# Patient Record
Sex: FEMALE | Race: BLACK OR AFRICAN AMERICAN | NOT HISPANIC OR LATINO | Employment: FULL TIME | URBAN - METROPOLITAN AREA
[De-identification: names, ages, dates, MRNs, and addresses within clinical notes are randomized per-mention and may not be internally consistent; named-entity substitution may affect disease eponyms.]

---

## 2017-02-01 ENCOUNTER — ANESTHESIA (OUTPATIENT)
Dept: SURGERY | Facility: HOSPITAL | Age: 57
DRG: 340 | End: 2017-02-01
Payer: COMMERCIAL

## 2017-02-01 ENCOUNTER — HOSPITAL ENCOUNTER (INPATIENT)
Facility: HOSPITAL | Age: 57
LOS: 1 days | Discharge: HOME | DRG: 340 | End: 2017-02-02
Attending: EMERGENCY MEDICINE | Admitting: SURGERY
Payer: COMMERCIAL

## 2017-02-01 ENCOUNTER — ANESTHESIA EVENT (OUTPATIENT)
Dept: SURGERY | Facility: HOSPITAL | Age: 57
DRG: 340 | End: 2017-02-01
Payer: COMMERCIAL

## 2017-02-01 DIAGNOSIS — Z13.9 SCREENING: ICD-10-CM

## 2017-02-01 DIAGNOSIS — K35.80 ACUTE APPENDICITIS: ICD-10-CM

## 2017-02-01 DIAGNOSIS — R10.31 RIGHT LOWER QUADRANT PAIN: Primary | ICD-10-CM

## 2017-02-01 DIAGNOSIS — Z86.018 H/O PHEOCHROMOCYTOMA: ICD-10-CM

## 2017-02-01 DIAGNOSIS — K35.30 ACUTE APPENDICITIS WITH LOCALIZED PERITONITIS: ICD-10-CM

## 2017-02-01 LAB
ALBUMIN SERPL BCP-MCNC: 3.8 G/DL
ALP SERPL-CCNC: 101 U/L
ALT SERPL W/O P-5'-P-CCNC: 16 U/L
ANION GAP SERPL CALC-SCNC: 8 MMOL/L
AST SERPL-CCNC: 21 U/L
BASOPHILS # BLD AUTO: 0.02 K/UL
BASOPHILS NFR BLD: 0.2 %
BILIRUB SERPL-MCNC: 0.8 MG/DL
BILIRUB UR QL STRIP: NEGATIVE
BUN SERPL-MCNC: 13 MG/DL
CALCIUM SERPL-MCNC: 9.1 MG/DL
CHLORIDE SERPL-SCNC: 107 MMOL/L
CLARITY UR REFRACT.AUTO: CLEAR
CO2 SERPL-SCNC: 22 MMOL/L
COLOR UR AUTO: YELLOW
CREAT SERPL-MCNC: 0.7 MG/DL
DIFFERENTIAL METHOD: ABNORMAL
EOSINOPHIL # BLD AUTO: 0 K/UL
EOSINOPHIL NFR BLD: 0.1 %
ERYTHROCYTE [DISTWIDTH] IN BLOOD BY AUTOMATED COUNT: 12.1 %
EST. GFR  (AFRICAN AMERICAN): >60 ML/MIN/1.73 M^2
EST. GFR  (NON AFRICAN AMERICAN): >60 ML/MIN/1.73 M^2
GLUCOSE SERPL-MCNC: 101 MG/DL
GLUCOSE UR QL STRIP: NEGATIVE
HCT VFR BLD AUTO: 39 %
HGB BLD-MCNC: 13.6 G/DL
HGB UR QL STRIP: NEGATIVE
KETONES UR QL STRIP: NEGATIVE
LEUKOCYTE ESTERASE UR QL STRIP: NEGATIVE
LIPASE SERPL-CCNC: 7 U/L
LYMPHOCYTES # BLD AUTO: 1.4 K/UL
LYMPHOCYTES NFR BLD: 11.8 %
MCH RBC QN AUTO: 31.1 PG
MCHC RBC AUTO-ENTMCNC: 34.9 %
MCV RBC AUTO: 89 FL
MONOCYTES # BLD AUTO: 0.7 K/UL
MONOCYTES NFR BLD: 5.9 %
NEUTROPHILS # BLD AUTO: 9.5 K/UL
NEUTROPHILS NFR BLD: 81.7 %
NITRITE UR QL STRIP: NEGATIVE
PH UR STRIP: 8 [PH] (ref 5–8)
PLATELET # BLD AUTO: 204 K/UL
PMV BLD AUTO: 8.8 FL
POTASSIUM SERPL-SCNC: 4.1 MMOL/L
PROT SERPL-MCNC: 7.1 G/DL
PROT UR QL STRIP: NEGATIVE
RBC # BLD AUTO: 4.38 M/UL
SODIUM SERPL-SCNC: 137 MMOL/L
SP GR UR STRIP: 1.01 (ref 1–1.03)
URN SPEC COLLECT METH UR: NORMAL
UROBILINOGEN UR STRIP-ACNC: NEGATIVE EU/DL
WBC # BLD AUTO: 11.62 K/UL

## 2017-02-01 PROCEDURE — G0378 HOSPITAL OBSERVATION PER HR: HCPCS

## 2017-02-01 PROCEDURE — 99220 PR INITIAL OBSERVATION CARE,LEVL III: CPT | Mod: 57,,, | Performed by: SURGERY

## 2017-02-01 PROCEDURE — 27000221 HC OXYGEN, UP TO 24 HOURS

## 2017-02-01 PROCEDURE — 36000706: Performed by: SURGERY

## 2017-02-01 PROCEDURE — 99285 EMERGENCY DEPT VISIT HI MDM: CPT

## 2017-02-01 PROCEDURE — 36000707: Performed by: SURGERY

## 2017-02-01 PROCEDURE — 83690 ASSAY OF LIPASE: CPT

## 2017-02-01 PROCEDURE — 99283 EMERGENCY DEPT VISIT LOW MDM: CPT | Mod: ,,, | Performed by: EMERGENCY MEDICINE

## 2017-02-01 PROCEDURE — 25000003 PHARM REV CODE 250: Performed by: EMERGENCY MEDICINE

## 2017-02-01 PROCEDURE — 0DTJ0ZZ RESECTION OF APPENDIX, OPEN APPROACH: ICD-10-PCS | Performed by: SURGERY

## 2017-02-01 PROCEDURE — 25000003 PHARM REV CODE 250: Performed by: SURGERY

## 2017-02-01 PROCEDURE — 63600175 PHARM REV CODE 636 W HCPCS: Performed by: SURGERY

## 2017-02-01 PROCEDURE — 85025 COMPLETE CBC W/AUTO DIFF WBC: CPT

## 2017-02-01 PROCEDURE — 63600175 PHARM REV CODE 636 W HCPCS: Performed by: ANESTHESIOLOGY

## 2017-02-01 PROCEDURE — 80053 COMPREHEN METABOLIC PANEL: CPT

## 2017-02-01 PROCEDURE — 44950 APPENDECTOMY: CPT | Mod: ,,, | Performed by: SURGERY

## 2017-02-01 PROCEDURE — 96374 THER/PROPH/DIAG INJ IV PUSH: CPT

## 2017-02-01 PROCEDURE — 63600175 PHARM REV CODE 636 W HCPCS: Performed by: NURSE ANESTHETIST, CERTIFIED REGISTERED

## 2017-02-01 PROCEDURE — D9220A PRA ANESTHESIA: Mod: ANES,,, | Performed by: ANESTHESIOLOGY

## 2017-02-01 PROCEDURE — 71000039 HC RECOVERY, EACH ADD'L HOUR: Performed by: SURGERY

## 2017-02-01 PROCEDURE — 63600175 PHARM REV CODE 636 W HCPCS: Performed by: EMERGENCY MEDICINE

## 2017-02-01 PROCEDURE — 88304 TISSUE EXAM BY PATHOLOGIST: CPT | Performed by: PATHOLOGY

## 2017-02-01 PROCEDURE — 96375 TX/PRO/DX INJ NEW DRUG ADDON: CPT

## 2017-02-01 PROCEDURE — D9220A PRA ANESTHESIA: Mod: CRNA,,, | Performed by: NURSE ANESTHETIST, CERTIFIED REGISTERED

## 2017-02-01 PROCEDURE — 37000008 HC ANESTHESIA 1ST 15 MINUTES: Performed by: SURGERY

## 2017-02-01 PROCEDURE — 27201423 OPTIME MED/SURG SUP & DEVICES STERILE SUPPLY: Performed by: SURGERY

## 2017-02-01 PROCEDURE — 96361 HYDRATE IV INFUSION ADD-ON: CPT

## 2017-02-01 PROCEDURE — 25500020 PHARM REV CODE 255: Performed by: EMERGENCY MEDICINE

## 2017-02-01 PROCEDURE — 81003 URINALYSIS AUTO W/O SCOPE: CPT

## 2017-02-01 PROCEDURE — 88304 TISSUE EXAM BY PATHOLOGIST: CPT | Mod: 26,,, | Performed by: PATHOLOGY

## 2017-02-01 PROCEDURE — 94761 N-INVAS EAR/PLS OXIMETRY MLT: CPT

## 2017-02-01 PROCEDURE — 96376 TX/PRO/DX INJ SAME DRUG ADON: CPT

## 2017-02-01 PROCEDURE — 25000003 PHARM REV CODE 250: Performed by: NURSE ANESTHETIST, CERTIFIED REGISTERED

## 2017-02-01 PROCEDURE — 37000009 HC ANESTHESIA EA ADD 15 MINS: Performed by: SURGERY

## 2017-02-01 PROCEDURE — 71000033 HC RECOVERY, INTIAL HOUR: Performed by: SURGERY

## 2017-02-01 RX ORDER — OXYCODONE AND ACETAMINOPHEN 10; 325 MG/1; MG/1
1 TABLET ORAL EVERY 4 HOURS PRN
Status: DISCONTINUED | OUTPATIENT
Start: 2017-02-01 | End: 2017-02-02 | Stop reason: HOSPADM

## 2017-02-01 RX ORDER — ONDANSETRON 2 MG/ML
4 INJECTION INTRAMUSCULAR; INTRAVENOUS ONCE AS NEEDED
Status: DISCONTINUED | OUTPATIENT
Start: 2017-02-01 | End: 2017-02-01

## 2017-02-01 RX ORDER — KETOROLAC TROMETHAMINE 30 MG/ML
INJECTION, SOLUTION INTRAMUSCULAR; INTRAVENOUS
Status: DISCONTINUED | OUTPATIENT
Start: 2017-02-01 | End: 2017-02-01

## 2017-02-01 RX ORDER — FENTANYL CITRATE 50 UG/ML
INJECTION, SOLUTION INTRAMUSCULAR; INTRAVENOUS
Status: DISCONTINUED | OUTPATIENT
Start: 2017-02-01 | End: 2017-02-01

## 2017-02-01 RX ORDER — HYDROMORPHONE HYDROCHLORIDE 1 MG/ML
0.2 INJECTION, SOLUTION INTRAMUSCULAR; INTRAVENOUS; SUBCUTANEOUS EVERY 5 MIN PRN
Status: DISCONTINUED | OUTPATIENT
Start: 2017-02-01 | End: 2017-02-01

## 2017-02-01 RX ORDER — GLYCOPYRROLATE 0.2 MG/ML
INJECTION INTRAMUSCULAR; INTRAVENOUS
Status: DISCONTINUED | OUTPATIENT
Start: 2017-02-01 | End: 2017-02-01

## 2017-02-01 RX ORDER — BUPIVACAINE HYDROCHLORIDE 5 MG/ML
INJECTION, SOLUTION PERINEURAL
Status: DISCONTINUED | OUTPATIENT
Start: 2017-02-01 | End: 2017-02-01 | Stop reason: HOSPADM

## 2017-02-01 RX ORDER — DEXAMETHASONE SODIUM PHOSPHATE 4 MG/ML
INJECTION, SOLUTION INTRA-ARTICULAR; INTRALESIONAL; INTRAMUSCULAR; INTRAVENOUS; SOFT TISSUE
Status: DISCONTINUED | OUTPATIENT
Start: 2017-02-01 | End: 2017-02-01

## 2017-02-01 RX ORDER — SODIUM CHLORIDE 0.9 % (FLUSH) 0.9 %
3 SYRINGE (ML) INJECTION EVERY 8 HOURS
Status: DISCONTINUED | OUTPATIENT
Start: 2017-02-01 | End: 2017-02-02 | Stop reason: HOSPADM

## 2017-02-01 RX ORDER — ACETAMINOPHEN 10 MG/ML
INJECTION, SOLUTION INTRAVENOUS
Status: DISCONTINUED | OUTPATIENT
Start: 2017-02-01 | End: 2017-02-01

## 2017-02-01 RX ORDER — LIDOCAINE HCL/PF 100 MG/5ML
SYRINGE (ML) INTRAVENOUS
Status: DISCONTINUED | OUTPATIENT
Start: 2017-02-01 | End: 2017-02-01

## 2017-02-01 RX ORDER — ROCURONIUM BROMIDE 10 MG/ML
INJECTION, SOLUTION INTRAVENOUS
Status: DISCONTINUED | OUTPATIENT
Start: 2017-02-01 | End: 2017-02-01

## 2017-02-01 RX ORDER — HYDROMORPHONE HYDROCHLORIDE 1 MG/ML
0.5 INJECTION, SOLUTION INTRAMUSCULAR; INTRAVENOUS; SUBCUTANEOUS EVERY 4 HOURS PRN
Status: DISCONTINUED | OUTPATIENT
Start: 2017-02-01 | End: 2017-02-02 | Stop reason: HOSPADM

## 2017-02-01 RX ORDER — SODIUM CHLORIDE 0.9 % (FLUSH) 0.9 %
3 SYRINGE (ML) INJECTION
Status: DISCONTINUED | OUTPATIENT
Start: 2017-02-01 | End: 2017-02-02 | Stop reason: HOSPADM

## 2017-02-01 RX ORDER — ONDANSETRON 2 MG/ML
4 INJECTION INTRAMUSCULAR; INTRAVENOUS
Status: COMPLETED | OUTPATIENT
Start: 2017-02-01 | End: 2017-02-01

## 2017-02-01 RX ORDER — DIPHENHYDRAMINE HYDROCHLORIDE 50 MG/ML
25 INJECTION INTRAMUSCULAR; INTRAVENOUS EVERY 4 HOURS PRN
Status: DISCONTINUED | OUTPATIENT
Start: 2017-02-01 | End: 2017-02-02 | Stop reason: HOSPADM

## 2017-02-01 RX ORDER — PROPOFOL 10 MG/ML
VIAL (ML) INTRAVENOUS
Status: DISCONTINUED | OUTPATIENT
Start: 2017-02-01 | End: 2017-02-01

## 2017-02-01 RX ORDER — HYDROMORPHONE HYDROCHLORIDE 1 MG/ML
0.2 INJECTION, SOLUTION INTRAMUSCULAR; INTRAVENOUS; SUBCUTANEOUS EVERY 5 MIN PRN
Status: DISCONTINUED | OUTPATIENT
Start: 2017-02-01 | End: 2017-02-01 | Stop reason: HOSPADM

## 2017-02-01 RX ORDER — MORPHINE SULFATE 2 MG/ML
6 INJECTION, SOLUTION INTRAMUSCULAR; INTRAVENOUS
Status: COMPLETED | OUTPATIENT
Start: 2017-02-01 | End: 2017-02-01

## 2017-02-01 RX ORDER — ONDANSETRON 2 MG/ML
4 INJECTION INTRAMUSCULAR; INTRAVENOUS ONCE AS NEEDED
Status: DISCONTINUED | OUTPATIENT
Start: 2017-02-01 | End: 2017-02-01 | Stop reason: HOSPADM

## 2017-02-01 RX ORDER — NEOSTIGMINE METHYLSULFATE 1 MG/ML
INJECTION, SOLUTION INTRAVENOUS
Status: DISCONTINUED | OUTPATIENT
Start: 2017-02-01 | End: 2017-02-01

## 2017-02-01 RX ORDER — ACETAMINOPHEN 325 MG/1
650 TABLET ORAL EVERY 8 HOURS PRN
Status: DISCONTINUED | OUTPATIENT
Start: 2017-02-01 | End: 2017-02-02 | Stop reason: HOSPADM

## 2017-02-01 RX ORDER — MORPHINE SULFATE 2 MG/ML
4 INJECTION, SOLUTION INTRAMUSCULAR; INTRAVENOUS
Status: COMPLETED | OUTPATIENT
Start: 2017-02-01 | End: 2017-02-01

## 2017-02-01 RX ORDER — METRONIDAZOLE 500 MG/100ML
500 INJECTION, SOLUTION INTRAVENOUS
Status: COMPLETED | OUTPATIENT
Start: 2017-02-01 | End: 2017-02-02

## 2017-02-01 RX ORDER — MIDAZOLAM HYDROCHLORIDE 1 MG/ML
INJECTION, SOLUTION INTRAMUSCULAR; INTRAVENOUS
Status: DISCONTINUED | OUTPATIENT
Start: 2017-02-01 | End: 2017-02-01

## 2017-02-01 RX ORDER — HYDROMORPHONE HYDROCHLORIDE 1 MG/ML
1 INJECTION, SOLUTION INTRAMUSCULAR; INTRAVENOUS; SUBCUTANEOUS EVERY 4 HOURS PRN
Status: DISCONTINUED | OUTPATIENT
Start: 2017-02-01 | End: 2017-02-01

## 2017-02-01 RX ORDER — OXYCODONE AND ACETAMINOPHEN 5; 325 MG/1; MG/1
1 TABLET ORAL EVERY 4 HOURS PRN
Status: DISCONTINUED | OUTPATIENT
Start: 2017-02-01 | End: 2017-02-02 | Stop reason: HOSPADM

## 2017-02-01 RX ORDER — CEFAZOLIN SODIUM 2 G/50ML
2 SOLUTION INTRAVENOUS
Status: DISCONTINUED | OUTPATIENT
Start: 2017-02-01 | End: 2017-02-02 | Stop reason: HOSPADM

## 2017-02-01 RX ORDER — ONDANSETRON 2 MG/ML
INJECTION INTRAMUSCULAR; INTRAVENOUS
Status: DISCONTINUED | OUTPATIENT
Start: 2017-02-01 | End: 2017-02-01

## 2017-02-01 RX ORDER — ONDANSETRON 2 MG/ML
4 INJECTION INTRAMUSCULAR; INTRAVENOUS EVERY 6 HOURS PRN
Status: DISCONTINUED | OUTPATIENT
Start: 2017-02-01 | End: 2017-02-02 | Stop reason: HOSPADM

## 2017-02-01 RX ORDER — MORPHINE SULFATE 2 MG/ML
2 INJECTION, SOLUTION INTRAMUSCULAR; INTRAVENOUS EVERY 4 HOURS PRN
Status: DISCONTINUED | OUTPATIENT
Start: 2017-02-01 | End: 2017-02-01

## 2017-02-01 RX ORDER — SODIUM CHLORIDE 9 MG/ML
INJECTION, SOLUTION INTRAVENOUS CONTINUOUS
Status: DISCONTINUED | OUTPATIENT
Start: 2017-02-01 | End: 2017-02-02 | Stop reason: HOSPADM

## 2017-02-01 RX ORDER — CIPROFLOXACIN 2 MG/ML
400 INJECTION, SOLUTION INTRAVENOUS
Status: COMPLETED | OUTPATIENT
Start: 2017-02-01 | End: 2017-02-02

## 2017-02-01 RX ORDER — SUCCINYLCHOLINE CHLORIDE 20 MG/ML
INJECTION INTRAMUSCULAR; INTRAVENOUS
Status: DISCONTINUED | OUTPATIENT
Start: 2017-02-01 | End: 2017-02-01

## 2017-02-01 RX ADMIN — SODIUM CHLORIDE: 0.9 INJECTION, SOLUTION INTRAVENOUS at 06:02

## 2017-02-01 RX ADMIN — ACETAMINOPHEN 1000 MG: 10 INJECTION, SOLUTION INTRAVENOUS at 07:02

## 2017-02-01 RX ADMIN — FENTANYL CITRATE 50 MCG: 50 INJECTION, SOLUTION INTRAMUSCULAR; INTRAVENOUS at 07:02

## 2017-02-01 RX ADMIN — CIPROFLOXACIN 400 MG: 2 INJECTION, SOLUTION INTRAVENOUS at 10:02

## 2017-02-01 RX ADMIN — METRONIDAZOLE 500 MG: 500 INJECTION, SOLUTION INTRAVENOUS at 09:02

## 2017-02-01 RX ADMIN — FENTANYL CITRATE 100 MCG: 50 INJECTION, SOLUTION INTRAMUSCULAR; INTRAVENOUS at 08:02

## 2017-02-01 RX ADMIN — PROPOFOL 120 MG: 10 INJECTION, EMULSION INTRAVENOUS at 07:02

## 2017-02-01 RX ADMIN — MIDAZOLAM HYDROCHLORIDE 2 MG: 1 INJECTION, SOLUTION INTRAMUSCULAR; INTRAVENOUS at 07:02

## 2017-02-01 RX ADMIN — ROCURONIUM BROMIDE 30 MG: 10 INJECTION, SOLUTION INTRAVENOUS at 07:02

## 2017-02-01 RX ADMIN — SODIUM CHLORIDE: 0.9 INJECTION, SOLUTION INTRAVENOUS at 07:02

## 2017-02-01 RX ADMIN — SODIUM CHLORIDE, SODIUM LACTATE, POTASSIUM CHLORIDE, AND CALCIUM CHLORIDE 1000 ML: .6; .31; .03; .02 INJECTION, SOLUTION INTRAVENOUS at 11:02

## 2017-02-01 RX ADMIN — SODIUM CHLORIDE, SODIUM LACTATE, POTASSIUM CHLORIDE, AND CALCIUM CHLORIDE 1000 ML: .6; .31; .03; .02 INJECTION, SOLUTION INTRAVENOUS at 08:02

## 2017-02-01 RX ADMIN — IOHEXOL 100 ML: 350 INJECTION, SOLUTION INTRAVENOUS at 02:02

## 2017-02-01 RX ADMIN — ONDANSETRON 4 MG: 2 INJECTION INTRAMUSCULAR; INTRAVENOUS at 08:02

## 2017-02-01 RX ADMIN — HYDROMORPHONE HYDROCHLORIDE 0.2 MG: 1 INJECTION, SOLUTION INTRAMUSCULAR; INTRAVENOUS; SUBCUTANEOUS at 09:02

## 2017-02-01 RX ADMIN — ROCURONIUM BROMIDE 10 MG: 10 INJECTION, SOLUTION INTRAVENOUS at 07:02

## 2017-02-01 RX ADMIN — CEFAZOLIN SODIUM 2 G: 2 SOLUTION INTRAVENOUS at 07:02

## 2017-02-01 RX ADMIN — GLYCOPYRROLATE 0.6 MG: 0.2 INJECTION, SOLUTION INTRAMUSCULAR; INTRAVENOUS at 08:02

## 2017-02-01 RX ADMIN — SUCCINYLCHOLINE CHLORIDE 100 MG: 20 INJECTION, SOLUTION INTRAMUSCULAR; INTRAVENOUS at 07:02

## 2017-02-01 RX ADMIN — ONDANSETRON 4 MG: 2 INJECTION INTRAMUSCULAR; INTRAVENOUS at 10:02

## 2017-02-01 RX ADMIN — KETOROLAC TROMETHAMINE 30 MG: 30 INJECTION, SOLUTION INTRAMUSCULAR; INTRAVENOUS at 08:02

## 2017-02-01 RX ADMIN — NEOSTIGMINE METHYLSULFATE 5 MG: 1 INJECTION INTRAVENOUS at 08:02

## 2017-02-01 RX ADMIN — MORPHINE SULFATE 4 MG: 2 INJECTION, SOLUTION INTRAMUSCULAR; INTRAVENOUS at 02:02

## 2017-02-01 RX ADMIN — DEXAMETHASONE SODIUM PHOSPHATE 8 MG: 4 INJECTION, SOLUTION INTRAMUSCULAR; INTRAVENOUS at 07:02

## 2017-02-01 RX ADMIN — SODIUM CHLORIDE, PRESERVATIVE FREE 3 ML: 5 INJECTION INTRAVENOUS at 10:02

## 2017-02-01 RX ADMIN — OXYCODONE HYDROCHLORIDE AND ACETAMINOPHEN 1 TABLET: 10; 325 TABLET ORAL at 09:02

## 2017-02-01 RX ADMIN — LIDOCAINE HYDROCHLORIDE 100 MG: 20 INJECTION, SOLUTION INTRAVENOUS at 07:02

## 2017-02-01 RX ADMIN — SODIUM CHLORIDE: 0.9 INJECTION, SOLUTION INTRAVENOUS at 09:02

## 2017-02-01 RX ADMIN — MORPHINE SULFATE 6 MG: 2 INJECTION, SOLUTION INTRAMUSCULAR; INTRAVENOUS at 08:02

## 2017-02-01 RX ADMIN — HYDROMORPHONE HYDROCHLORIDE 0.2 MG: 1 INJECTION, SOLUTION INTRAMUSCULAR; INTRAVENOUS; SUBCUTANEOUS at 08:02

## 2017-02-01 NOTE — ED TRIAGE NOTES
Pt c/o epigastric pain, nausea, vomiting, diarrhea that began at 0230 this morning. Denies blood in emesis or stool. Pt states she has had a bowel obstruction in the past and feels the same. Pt states she took dulcolax and Peptobismol without relief. Pain is located in epigastric and and umbilical region. Pt c/o nausea and 10/10 pain

## 2017-02-01 NOTE — ED PROVIDER NOTES
Encounter Date: 2/1/2017    SCRIBE #1 NOTE: I, Lisandro Qiu, am scribing for, and in the presence of,  Dr. Box. I have scribed the entire note.       History     Chief Complaint   Patient presents with    Abdominal Pain     had boweol obstruction in past, started at 2am, took dulcolox, nvd     Review of patient's allergies indicates:  No Known Allergies  HPI Comments: Time patient was seen by the provider: 8:05 AM      The patient is a 56 y.o. female with hx of: bowel obstruction and abdominal adhesion surgery that presents to the ED with a complaint of severe epigastric abdominal pain, which began 6 hours ago. She notes an associated nausea/vomiting. Pt states symptoms are similar but less severe from previous bowel obstruction. She endorses taking Dulcolax and Pepto-bismol without symptoms relief. No fever.   The history is provided by the patient.     Past Medical History   Diagnosis Date    Genital warts     H/O pheochromocytoma      Past Medical History Pertinent Negatives   Diagnosis Date Noted    Abnormal Pap smear 10/10/2013    Adrenal tumor 10/10/2013    Coronary artery disease 4/4/2014    Depression 4/4/2014    Diabetes mellitus 4/4/2014    Tumor cells, malignant 10/10/2013     Past Surgical History   Procedure Laterality Date    Tumor removal  2004    Abdominal adhesion surgery      Cervical conization   w/ laser      Colonoscopy N/A 8/1/2016     Procedure: COLONOSCOPY;  Surgeon: MIRNA Hawk MD;  Location: [facility] (4TH FLR);  Service: Endoscopy;  Laterality: N/A;    Appendectomy N/A 2/1/2017     Procedure: APPENDECTOMY;  Surgeon: Mando Mccarthy MD;  Location: [facility] OR [address] [place] [address];  Service: General;  Laterality: N/A;     Family History   Problem Relation Age of Onset    Diabetes Mother     Stroke Mother     Heart disease Father     Hypertension Father     Cancer Maternal Aunt     Breast cancer Cousin     Colon cancer Neg Hx     Ovarian cancer Neg Hx      Social History    Substance Use Topics    Smoking status: Never Smoker    Smokeless tobacco: Never Used    Alcohol use No     Review of Systems   Constitutional: Negative for fever.   HENT: Negative for sore throat.    Respiratory: Negative for shortness of breath.    Cardiovascular: Negative for chest pain.   Gastrointestinal: Positive for abdominal pain, nausea and vomiting.   Genitourinary: Negative for dysuria.   Musculoskeletal: Negative for back pain.   Skin: Negative for rash.   Neurological: Negative for weakness.   Hematological: Does not bruise/bleed easily.       Physical Exam   Initial Vitals   BP Pulse Resp Temp SpO2   02/01/17 0716 02/01/17 0716 02/01/17 0716 02/01/17 0716 02/01/17 0716   168/89 81 18 98.3 °F (36.8 °C) 100 %     Physical Exam    Nursing note and vitals reviewed.  Constitutional: She appears well-developed and well-nourished.   HENT:   Head: Normocephalic and atraumatic.   Cardiovascular: Normal rate, regular rhythm and normal heart sounds.   No murmur heard.  Pulmonary/Chest: Breath sounds normal. No respiratory distress.   Abdominal: There is no rebound and no guarding.   Tenderness in the epigastrium    Musculoskeletal: Normal range of motion.   Neurological: She is alert and oriented to person, place, and time.   Skin: Skin is warm and dry.         ED Course   Procedures  Labs Reviewed   CBC W/ AUTO DIFFERENTIAL - Abnormal; Notable for the following:        Result Value    MCH 31.1 (*)     MPV 8.8 (*)     Gran # 9.5 (*)     Gran% 81.7 (*)     Lymph% 11.8 (*)     All other components within normal limits   COMPREHENSIVE METABOLIC PANEL - Abnormal; Notable for the following:     CO2 22 (*)     All other components within normal limits   LIPASE   URINALYSIS             Medical Decision Making:   History:   Old Medical Records: I decided to obtain old medical records.  Initial Assessment:   Pt with epigastric abdominal pain. Will rule out bowel obstruction, do CT, labs and anticipate admission.     Clinical Tests:   Lab Tests: Ordered and Reviewed  Radiological Study: Ordered and Reviewed            Scribe Attestation:   Scribe #1: I performed the above scribed service and the documentation accurately describes the services I performed. I attest to the accuracy of the note.    Attending Attestation:           Physician Attestation for Scribe:  Physician Attestation Statement for Scribe #1: I, Dr. Box, reviewed documentation, as scribed by Lisandro Qiu in my presence, and it is both accurate and complete.         CT shows appendicitis.  Surgery to admit for OR.        ED Course     Clinical Impression:   The primary encounter diagnosis was Right lower quadrant pain. Diagnoses of Acute appendicitis, Acute appendicitis with localized peritonitis, H/O pheochromocytoma, and Screening were also pertinent to this visit.          Gopi Box MD  02/04/17 0107

## 2017-02-01 NOTE — IP AVS SNAPSHOT
[facility]  [address]  [facility] [address]  Phone: [phone]           Patient Discharge Instructions     Our goal is to set you up for success. This packet includes information on your condition, medications, and your home care. It will help you to care for yourself so you don't get sicker and need to go back to the hospital.     Please ask your nurse if you have any questions.        There are many details to remember when preparing to leave the hospital. Here is what you will need to do:    1. Take your medicine. If you are prescribed medications, review your Medication List in the following pages. You may have new medications to  at the pharmacy and others that you'll need to stop taking. Review the instructions for how and when to take your medications. Talk with your doctor or nurses if you are unsure of what to do.     2. Go to your follow-up appointments. Specific follow-up information is listed in the following pages. Your may be contacted by a transition nurse or clinical provider about future appointments. Be sure we have all of the phone numbers to reach you, if needed. Please contact your provider's office if you are unable to make an appointment.     3. Watch for warning signs. Your doctor or nurse will give you detailed warning signs to watch for and when to call for assistance. These instructions may also include educational information about your condition. If you experience any of warning signs to your health, call your doctor.               [facility]  Unless otherwise directed by your provider, please contact [facility], our nurse care line that is available for 24/7 assistance.     [phone] (toll-free)    Registered nurses in the [facility] provide clinical advisement, health education, appointment booking, and other advisory services.                    ** Verify the list of medication(s) below is accurate and up  to date. Carry this with you in case of emergency. If your medications have changed, please notify your healthcare provider.             Medication List      START taking these medications        Additional Info                      docusate sodium 50 MG capsule   Commonly known as:  COLACE   Refills:  0   Dose:  50 mg    Instructions:  Take 1 capsule (50 mg total) by mouth 2 (two) times daily.     Begin Date    AM    Noon    PM    Bedtime       oxycodone-acetaminophen 5-325 mg per tablet   Commonly known as:  PERCOCET   Quantity:  60 tablet   Refills:  0   Dose:  1-2 tablet    Instructions:  Take 1-2 tablets by mouth every 4 (four) hours as needed for Pain.     Begin Date    AM    Noon    PM    Bedtime         CONTINUE taking these medications        Additional Info                      levonorgestrel 20 mcg/24 hr (5 years) IUD   Commonly known as:  MIRENA   Refills:  0   Dose:  1 each    Instructions:  1 each by Intrauterine route once.     Begin Date    AM    Noon    PM    Bedtime            Where to Get Your Medications      You can get these medications from any pharmacy     Bring a paper prescription for each of these medications     oxycodone-acetaminophen 5-325 mg per tablet         Information about where to get these medications is not yet available     ! Ask your nurse or doctor about these medications     docusate sodium 50 MG capsule                  Please bring to all follow up appointments:    1. A copy of your discharge instructions.  2. All medicines you are currently taking in their original bottles.  3. Identification and insurance card.    Please arrive 15 minutes ahead of scheduled appointment time.    Please call 24 hours in advance if you must reschedule your appointment and/or time.        Your Scheduled Appointments     Feb 17, 2017 10:45 AM CST   Post OP with MD Jaspreet Mason - General Surgery ([address] )    [address]  [facility] [address]  "  [phone]            Mar 06, 2017  9:20 AM CST   Physical with Agustin Hernández MD   [facility]    [address]  [facility] [address] [phone]              Follow-up Information     Follow up with Mando Mccarthy MD In 2 weeks.    Specialty:  General Surgery    Why:  Wound check/Post-op appt scheduled on 2/17/17 at 10:45 AM.     Contact information:    [address]  [facility] [address] [phone]          Discharge Instructions     Future Orders    Call MD for:  difficulty breathing or increased cough     Call MD for:  persistent nausea and vomiting or diarrhea     Call MD for:  redness, tenderness, or signs of infection (pain, swelling, redness, odor or green/yellow discharge around incision site)     Call MD for:  severe uncontrolled pain     Call MD for:  temperature >100.4     Diet general     Questions:    Total calories:      Fat restriction, if any:      Protein restriction, if any:      Na restriction, if any:      Fluid restriction:      Additional restrictions:      Lifting restrictions     Comments:    No heavy lifting, nothing heavier than 10lbs        Primary Diagnosis     Your primary diagnosis was:  Appendicitis      Admission Information     Date & Time Provider Department CSN    2/1/2017  7:55 AM Mando Mccarthy MD [facility] [id number]      Care Providers     Provider Role Specialty Primary office phone    Mando Mccarthy MD Attending Provider General Surgery [phone]    Mando Mccarthy MD Surgeon  General Surgery [phone]      Your Vitals Were     BP Pulse Temp Resp Height Weight    108/68 87 97.8 °F (36.6 °C) (Oral) 18 5' 8" (1.727 m) 82.6 kg (182 lb)    SpO2 BMI             99% 27.67 kg/m2         Recent Lab Values        10/10/2013                           1:57 PM           A1C 5.4                       Pending Labs     Order Current Status    Specimen to Pathology - Surgery In process    "   Allergies as of 2/2/2017     No Known Allergies      Advance Directives     An advance directive is a document which, in the event you are no longer able to make decisions for yourself, tells your healthcare team what kind of treatment you do or do not want to receive, or who you would like to make those decisions for you.  If you do not currently have an advance directive, [facility] encourages you to create one.  For more information call:  [phone]), [phone] ([phone]),  or log on to [email or web address].        Language Assistance Services     ATTENTION: Language assistance services are available, free of charge. Please call [phone].      ATENCIÓN: Si norala clarisa, tiene a escamilla disposición servicios gratuitos de asistencia lingüística. Llame al [phone].     CHÚ Ý: N?u b?n nói Ti?ng Vi?t, có các d?ch v? h? tr? ngôn ng? mi?n phí dành cho b?n. G?i s? [phone].        [facility] Sign-Up     Activating your [facility] account is as easy as 1-2-3!     1) Visit [email or web address], select Sign Up Now, enter this activation code and your date of birth, then select Next.  [email or web address]  Expires: 3/19/2017  1:37 PM      2) Create a username and password to use when you visit [facility] in the future and select a security question in case you lose your password and select Next.    3) Enter your e-mail address and click Sign Up!    Additional Information  If you have questions, please e-mail [email or web address] or call [phone] to talk to our [facility] staff. Remember, [facility] is NOT to be used for urgent needs. For medical emergencies, dial 911.          [facility] complies with applicable Federal civil rights laws and does not discriminate on the basis of race, color, national origin, age, disability, or sex.

## 2017-02-01 NOTE — CONSULTS
Consult Note  General Surgery    Consult Requested By: ED  Reason for Consult: abd pain    SUBJECTIVE:     History of Present Illness:  Patient is a 56 y.o. female s/p lap converted to open right adrenalectomy for pheo in 2005, s/p ex lap and enterolysis for sbo in 2006, who presents with 1 day history of lower abd pain.  Pain started at 2 am, now localizing to RLQ.  Assoc with nausea / emesis, and diarrhea.  Unable to eat since yesterday.  No fevers / chills.  She has never felt this way before.      Scheduled Meds:   sodium chloride 0.9%  3 mL Intravenous Q8H     Continuous Infusions:   sodium chloride 0.9%       PRN Meds:acetaminophen, ceFAZolin 2 g/50mL Dextrose IVPB, diphenhydrAMINE, HYDROmorphone, morphine, ondansetron, promethazine (PHENERGAN) IVPB    Review of patient's allergies indicates:  No Known Allergies    Past Medical History   Diagnosis Date    Genital warts     H/O pheochromocytoma      Past Surgical History   Procedure Laterality Date    Tumor removal  2004    Abdominal adhesion surgery      Cervical conization   w/ laser      Colonoscopy N/A 8/1/2016     Procedure: COLONOSCOPY;  Surgeon: MIRNA Hawk MD;  Location: [address]);  Service: Endoscopy;  Laterality: N/A;     Family History   Problem Relation Age of Onset    Diabetes Mother     Stroke Mother     Heart disease Father     Hypertension Father     Cancer Maternal Aunt     Breast cancer Cousin     Colon cancer Neg Hx     Ovarian cancer Neg Hx      Social History   Substance Use Topics    Smoking status: Never Smoker    Smokeless tobacco: Never Used    Alcohol use No        Review of Systems:  Constitutional: Negative for fever.   HENT: Negative for sore throat.   Respiratory: Negative for shortness of breath.   Cardiovascular: Negative for chest pain.   Gastrointestinal: Positive for abdominal pain, nausea and vomiting.   Genitourinary: Negative for dysuria.   Musculoskeletal: Negative for back pain.   Skin:  Negative for rash.   Neurological: Negative for weakness.   Hematological: Does not bruise/bleed easily.     OBJECTIVE:     Vital Signs (Most Recent)  Temp: 98.1 °F (36.7 °C) (02/01/17 1259)  Pulse: 78 (02/01/17 1259)  Resp: 18 (02/01/17 1259)  BP: (!) 161/79 (02/01/17 1259)  SpO2: 100 % (02/01/17 0716)    Vital Signs Range (Last 24H):  Temp:  [98.1 °F (36.7 °C)-98.3 °F (36.8 °C)]   Pulse:  [78-81]   Resp:  [18]   BP: (161-168)/(79-89)   SpO2:  [100 %]     Physical Exam:  General: well developed, well nourished, no distress  Head: normocephalic, atraumatic  Eyes:  conjunctivae/corneas clear.  Lungs:  clear to auscultation bilaterally and normal respiratory effort  Heart: regular rate and rhythm  Abdomen: soft, non-distended, exquisite tenderness at McBurney's point; well healed midline incision  Extremities: no cyanosis or edema, or clubbing  Skin: Skin color, texture, turgor normal. No rashes or lesions    Laboratory:  CBC:   Recent Labs  Lab 02/01/17  1257   WBC 11.62   RBC 4.38   HGB 13.6   HCT 39.0        CMP:   Recent Labs  Lab 02/01/17  1257      CALCIUM 9.1   ALBUMIN 3.8   PROT 7.1      K 4.1   CO2 22*      BUN 13   CREATININE 0.7   ALKPHOS 101   ALT 16   AST 21   BILITOT 0.8     Coagulation: No results for input(s): INR, APTT in the last 168 hours.    Invalid input(s): PT    Diagnostic Results:  CT reviewed - dilated appendix at 1.5 cm without significant inflammatory changes    ASSESSMENT/PLAN:     57 y/o female with acute appendicitis    Place in obs / Surgery / Shari  NPO, IVF  IV abx  To OR for lap appy with low threshold to convert to open given prior surgical history.  Consent obtained.    This case was discussed with Dr. Mccarthy who is in agreement with this plan.  Patient and family updated on care plan and voice understanding.    Yosef Carr MD  General Surgery, PGY-V  [phone]

## 2017-02-01 NOTE — ANESTHESIA PREPROCEDURE EVALUATION
02/01/2017    Pre-operative evaluation for APPENDECTOMY-LAPAROSCOPIC (N/A)    Chief Complaint:    PMH:    Past Surgical History   Procedure Laterality Date    Tumor removal  2004    Abdominal adhesion surgery      Cervical conization   w/ laser      Colonoscopy N/A 8/1/2016     Procedure: COLONOSCOPY;  Surgeon: MIRNA Hawk MD;  Location: [facility] ([address] [place] [address]);  Service: Endoscopy;  Laterality: N/A;         Vital Signs Range (Last 24H):  Temp:  [36.7 °C (98.1 °F)-36.8 °C (98.3 °F)]   Pulse:  [78-81]   Resp:  [18]   BP: (161-168)/(79-89)   SpO2:  [100 %]       CBC:     Recent Labs  Lab 02/01/17  1257   WBC 11.62   RBC 4.38   HGB 13.6   HCT 39.0      MCV 89   MCH 31.1*   MCHC 34.9       CMP:   Recent Labs  Lab 02/01/17  1257      K 4.1      CO2 22*   BUN 13   CREATININE 0.7      CALCIUM 9.1   ALBUMIN 3.8   PROT 7.1   ALKPHOS 101   ALT 16   AST 21   BILITOT 0.8       INR:  No results for input(s): INR, PROTIME, APTT in the last 720 hours.    Invalid input(s): PT      Diagnostic Studies:      EKG:  None on file    2D Echo:  None on file    [facility] Anesthesia Evaluation    I have reviewed the Patient Summary Reports.    I have reviewed the Nursing Notes.   I have reviewed the Medications.     Review of Systems  Anesthesia Hx:  No problems with previous Anesthesia  History of prior surgery of interest to airway management or planning: Previous anesthesia: General Denies Family Hx of Anesthesia complications.   Denies Personal Hx of Anesthesia complications.   Social:  Non-Smoker, No Alcohol Use    Hematology/Oncology:  Hematology Normal   Oncology Normal     EENT/Dental:EENT/Dental Normal   Cardiovascular:   Exercise tolerance: good Denies Hypertension.  Denies MI.  Denies CAD.        hyperlipidemia    Pulmonary:  Pulmonary Normal  Denies COPD.  Denies Asthma.  Denies Shortness of  breath.  Denies Recent URI.    Renal/:  Renal/ Normal     Hepatic/GI:  Hepatic/GI Normal  Denies GERD.    Musculoskeletal:  Musculoskeletal Normal    Neurological:  Neurology Normal Denies TIA.  Denies CVA.    Endocrine:  Endocrine Normal Denies Diabetes.    Dermatological:  Skin Normal    Psych:  Psychiatric Normal           Physical Exam  General:  Well nourished    Airway/Jaw/Neck:  Airway Findings: Mouth Opening: Normal Tongue: Normal  General Airway Assessment: Adult  Mallampati: II  Improves to II with phonation.  TM Distance: Normal, at least 6 cm  Jaw/Neck Findings:  Micrognathia: Negative Mandibular Fracture: Negative    Neck ROM: Normal ROM  Neck Findings: Normal    Eyes/Ears/Nose:  EYES/EARS/NOSE FINDINGS: Normal   Dental:  Dental Findings: In tact    Chest/Lungs:  Chest/Lungs Findings: Clear to auscultation, Normal Respiratory Rate     Heart/Vascular:  Heart Findings: Rate: Normal  Rhythm: Regular Rhythm  Sounds: Normal  Heart murmur: negative Vascular Findings: Normal       Mental Status:  Mental Status Findings: Normal        Anesthesia Plan  Type of Anesthesia, risks & benefits discussed:  Anesthesia Type:  general  Patient's Preference:   Intra-op Monitoring Plan: standard ASA monitors  Intra-op Monitoring Plan Comments:   Post Op Pain Control Plan:   Post Op Pain Control Plan Comments:   Induction:   IV  Beta Blocker:  Patient is not currently on a Beta-Blocker (No further documentation required).       Informed Consent: Patient understands risks and agrees with Anesthesia plan.  Questions answered. Anesthesia consent signed with patient.  ASA Score: 2  emergent   Day of Surgery Review of History & Physical:    H&P update referred to the surgeon.         Ready For Surgery From Anesthesia Perspective.

## 2017-02-01 NOTE — ED NOTES
After several attempts to obtain IV access by several different nurses, unsuccessful. Dr. Box aware. Venipuncture called and aware pt needs blood

## 2017-02-01 NOTE — PROVIDER PROGRESS NOTES - EMERGENCY DEPT.
Encounter Date: 2/1/2017    ED Physician Progress Notes       SCRIBE NOTE: I, Janett Hanley, am scribing for, and in the presence of,  Dr. Loza.  Physician Statement: I, Dr. Loza, personally performed the services described in this documentation as scribed by Janett Hanley in my presence, and it is both accurate and complete.     Physician Note:   2:10 PM  Pt signed out to me in stable condition by Dr. Box, pending CT A/P and final disposition.    Pt is a 57 yo F who came to the ED for N/V and abdominal pain. Labs are unremarkable.     4:45 PM  CT A/P shows a dilated tubular structure in the RLQ which could be the appendix. This may represent developing appendicitis. Otherwise no evidence of obstruction. General surgery contacted and case discussed. They will evaluate the pt.    5:00 PM  Pt will be admitted to general surgery.

## 2017-02-01 NOTE — ED NOTES
LOC: The patient is awake, alert and aware of environment with an appropriate affect, the patient is oriented x 3 and speaking appropriately.  APPEARANCE: Patient resting comfortably and in no acute distress, patient is clean and well groomed, patient's clothing is properly fastened.  ABDOMEN: Soft, tender to palpation in epigastric area;  no distention noted. Bowel sounds present; nausea noted

## 2017-02-01 NOTE — ED NOTES
Venipuncture called @ x [phone] to get update on status of venipuncture tech to come to ER for pt blood draw. States she will have to call someone from the clinic to come draw blood

## 2017-02-02 VITALS
HEART RATE: 87 BPM | BODY MASS INDEX: 27.58 KG/M2 | TEMPERATURE: 98 F | WEIGHT: 182 LBS | OXYGEN SATURATION: 99 % | SYSTOLIC BLOOD PRESSURE: 108 MMHG | HEIGHT: 68 IN | DIASTOLIC BLOOD PRESSURE: 68 MMHG | RESPIRATION RATE: 18 BRPM

## 2017-02-02 LAB
ANION GAP SERPL CALC-SCNC: 11 MMOL/L
BASOPHILS # BLD AUTO: 0 K/UL
BASOPHILS NFR BLD: 0 %
BUN SERPL-MCNC: 11 MG/DL
CALCIUM SERPL-MCNC: 8.3 MG/DL
CHLORIDE SERPL-SCNC: 106 MMOL/L
CO2 SERPL-SCNC: 22 MMOL/L
CREAT SERPL-MCNC: 0.9 MG/DL
DIFFERENTIAL METHOD: ABNORMAL
EOSINOPHIL # BLD AUTO: 0 K/UL
EOSINOPHIL NFR BLD: 0 %
ERYTHROCYTE [DISTWIDTH] IN BLOOD BY AUTOMATED COUNT: 12.5 %
EST. GFR  (AFRICAN AMERICAN): >60 ML/MIN/1.73 M^2
EST. GFR  (NON AFRICAN AMERICAN): >60 ML/MIN/1.73 M^2
GLUCOSE SERPL-MCNC: 117 MG/DL
HCT VFR BLD AUTO: 36.9 %
HGB BLD-MCNC: 12.9 G/DL
LYMPHOCYTES # BLD AUTO: 1.1 K/UL
LYMPHOCYTES NFR BLD: 7.5 %
MAGNESIUM SERPL-MCNC: 2.1 MG/DL
MCH RBC QN AUTO: 31.2 PG
MCHC RBC AUTO-ENTMCNC: 35 %
MCV RBC AUTO: 89 FL
MONOCYTES # BLD AUTO: 0.4 K/UL
MONOCYTES NFR BLD: 2.9 %
NEUTROPHILS # BLD AUTO: 12.5 K/UL
NEUTROPHILS NFR BLD: 89.4 %
PHOSPHATE SERPL-MCNC: 3.7 MG/DL
PLATELET # BLD AUTO: 186 K/UL
PMV BLD AUTO: 9.3 FL
POTASSIUM SERPL-SCNC: 4.1 MMOL/L
RBC # BLD AUTO: 4.13 M/UL
SODIUM SERPL-SCNC: 139 MMOL/L
WBC # BLD AUTO: 13.96 K/UL

## 2017-02-02 PROCEDURE — 36415 COLL VENOUS BLD VENIPUNCTURE: CPT

## 2017-02-02 PROCEDURE — 85025 COMPLETE CBC W/AUTO DIFF WBC: CPT

## 2017-02-02 PROCEDURE — 83735 ASSAY OF MAGNESIUM: CPT

## 2017-02-02 PROCEDURE — 11000001 HC ACUTE MED/SURG PRIVATE ROOM

## 2017-02-02 PROCEDURE — 63600175 PHARM REV CODE 636 W HCPCS: Performed by: SURGERY

## 2017-02-02 PROCEDURE — 25000003 PHARM REV CODE 250: Performed by: SURGERY

## 2017-02-02 PROCEDURE — 84100 ASSAY OF PHOSPHORUS: CPT

## 2017-02-02 PROCEDURE — 80048 BASIC METABOLIC PNL TOTAL CA: CPT

## 2017-02-02 RX ORDER — OXYCODONE AND ACETAMINOPHEN 5; 325 MG/1; MG/1
1-2 TABLET ORAL EVERY 4 HOURS PRN
Qty: 60 TABLET | Refills: 0 | Status: SHIPPED | OUTPATIENT
Start: 2017-02-02 | End: 2017-02-13

## 2017-02-02 RX ADMIN — METRONIDAZOLE 500 MG: 500 INJECTION, SOLUTION INTRAVENOUS at 05:02

## 2017-02-02 RX ADMIN — METRONIDAZOLE 500 MG: 500 INJECTION, SOLUTION INTRAVENOUS at 12:02

## 2017-02-02 RX ADMIN — CIPROFLOXACIN 400 MG: 2 INJECTION, SOLUTION INTRAVENOUS at 08:02

## 2017-02-02 NOTE — PLAN OF CARE
Patient lives in a 2 story house w/spouse & adult son. Discharging to home w/no needs today.     [facility] Packet given to patient after informed about it;patient verbalized their understanding.        02/02/17 1320   Discharge Assessment   Assessment Type Discharge Planning Assessment   Confirmed/corrected address and phone number on facesheet? Yes   Assessment information obtained from? Patient;Medical Record   Expected Length of Stay (days) (1)   Communicated expected length of stay with patient/caregiver yes   Type of Healthcare Directive Received (Unknown)   Prior to hospitilization cognitive status: Alert/Oriented;No Deficits   Prior to hospitalization functional status: Independent   Current cognitive status: Alert/Oriented;No Deficits   Current Functional Status: Independent   Arrived From home or self-care  (Via ER)   Lives With child(young), adult;spouse  (1 son)   Able to Return to Prior Arrangements yes   Is patient able to care for self after discharge? Yes   How many people do you have in your home that can help with your care after discharge? 1   Who are your caregiver(s) and their phone number(s)? (SPouse: Jony REZA [phone]. )   Patient's perception of discharge disposition home or selfcare   Readmission Within The Last 30 Days no previous admission in last 30 days   Patient currently being followed by outpatient case management? No   Patient currently receives home health services? No   Does the patient currently use HME? No   Patient currently receives private duty nursing? N/A   Patient currently receives any other outside agency services? No   Equipment Currently Used at Home none   Do you have any problems affording any of your prescribed medications? No   Is the patient taking medications as prescribed? yes   Do you have any financial concerns preventing you from receiving the healthcare you need? No   Does the patient have transportation to healthcare appointments? Yes    Transportation Available car;family or friend will provide   On Dialysis? No   Does the patient receive services at the [facility]? No   Are there any open cases? No   Discharge Plan A Home with family   Discharge Plan B Home with family   Patient/Family In Agreement With Plan yes

## 2017-02-02 NOTE — PROGRESS NOTES
Progress Note  General Surgery    Admit Date: 2/1/2017  Post-operative Day: 1 Day Post-Op  Hospital Day: 2    SUBJECTIVE:     Follow-up For:  Procedure(s) (LRB):  APPENDECTOMY (N/A)    Pt seen and examined, no acute events overnight, s/p open appendectomy POD 1; patient denies pain V/F or BM, had mild nausea which was relieved by meds    Scheduled Meds:   ciprofloxacin  400 mg Intravenous Q12H    metronidazole  500 mg Intravenous Q8H    sodium chloride 0.9%  3 mL Intravenous Q8H     Continuous Infusions:   sodium chloride 0.9% 125 mL/hr at 02/01/17 2107     PRN Meds:acetaminophen, ceFAZolin 2 g/50mL Dextrose IVPB, diphenhydrAMINE, HYDROmorphone, ondansetron, oxycodone-acetaminophen, oxycodone-acetaminophen, promethazine (PHENERGAN) IVPB, sodium chloride 0.9%, sodium chloride 0.9%    Review of patient's allergies indicates:  No Known Allergies    OBJECTIVE:     Vital Signs (Most Recent)  Temp: 97.6 °F (36.4 °C) (02/02/17 0300)  Pulse: 78 (02/02/17 0300)  Resp: 20 (02/02/17 0300)  BP: 138/78 (02/02/17 0300)  SpO2: 98 % (02/02/17 0300)    Vital Signs Range (Last 24H):  Temp:  [97.6 °F (36.4 °C)-98.1 °F (36.7 °C)]   Pulse:  [56-78]   Resp:  [12-25]   BP: ()/(53-82)   SpO2:  [97 %-100 %]     I & O (Last 24H):  Intake/Output Summary (Last 24 hours) at 02/02/17 0731  Last data filed at 02/02/17 0526   Gross per 24 hour   Intake          1652.08 ml   Output              400 ml   Net          1252.08 ml     Physical Exam:  General: well developed, well nourished, no distress  Head: normocephalic, atraumatic  Lungs:  normal respiratory effort  Heart: regular rate and rhythm  Abdomen: soft, appropriate TTP, incision dressed - clean, dry and intact    Laboratory:  CBC:   Recent Labs  Lab 02/02/17  0449   WBC 13.96*   RBC 4.13   HGB 12.9   HCT 36.9*      MCV 89   MCH 31.2*   MCHC 35.0     BMP:   Recent Labs  Lab 02/02/17  0449   *      K 4.1      CO2 22*   BUN 11   CREATININE 0.9   CALCIUM  8.3*     CMP:   Recent Labs  Lab 02/01/17  1257 02/02/17  0449    117*   CALCIUM 9.1 8.3*   ALBUMIN 3.8  --    PROT 7.1  --     139   K 4.1 4.1   CO2 22* 22*    106   BUN 13 11   CREATININE 0.7 0.9   ALKPHOS 101  --    ALT 16  --    AST 21  --    BILITOT 0.8  --      Labs within the past 24 hours have been reviewed.    ASSESSMENT/PLAN:   57 yo female s/p open appendectomy POD 1  -acute appendicitis - advance diet, may dc today pending toleration of diet and pain control  -pain/nausea meds PRN  -OOB/amb/IS      Isatu Cerda PA-C  [id number]

## 2017-02-02 NOTE — NURSING
Pt discharged in stable condition, discharge instructions and prescriptions given, pt verbalized understanding. waiting on transportation. Malathi Mcghee RN

## 2017-02-02 NOTE — DISCHARGE SUMMARY
[facility]  Discharge Summary  General Surgery      Admit Date: 2/1/2017    Discharge Date and Time: 2/2/2017 1:35 PM    Attending Physician: Mando Mccarthy MD     Discharge Provider: Isatu Cerda    Reason for Admission: Acute appendicitis with localized peritonitis    Procedures Performed: Procedure(s) (LRB):  APPENDECTOMY (N/A)    Hospital Course (synopsis of major diagnoses, care, treatment, and services provided during the course of the hospital stay):   Patient presented to the ED with acute appendicitis. She underwent: Procedure(s) (LRB):  APPENDECTOMY (N/A). She tolerated the procedure well and was transferred to the floor after recovery from anesthesia. Please see the operative note for further procedure details. Vitals remained stable, and she was afebrile all throughout the post operative period. Labs were reviewed and electrolytes were replaced appropriately. Physical exam was appropriate for post operative state.  Diet was advanced, and she was able to tolerate a regular diet prior to discharge. She was ambulating without difficulty and had normal bowel function prior to discharge. Patient was deemed suitable for discharge on 1 Day Post-Op. She was discharged home with medications and instructions as below. She voiced understanding of the instructions prior to discharge.     For more thorough information, please refer to the hospital record and operative report.       Consults: none    Significant Diagnostic Studies: Labs:   BMP:   Recent Labs  Lab 02/01/17  1257 02/02/17  0449    117*    139   K 4.1 4.1    106   CO2 22* 22*   BUN 13 11   CREATININE 0.7 0.9   CALCIUM 9.1 8.3*   MG  --  2.1   , CMP   Recent Labs  Lab 02/01/17  1257 02/02/17  0449    139   K 4.1 4.1    106   CO2 22* 22*    117*   BUN 13 11   CREATININE 0.7 0.9   CALCIUM 9.1 8.3*   PROT 7.1  --    ALBUMIN 3.8  --    BILITOT 0.8  --    ALKPHOS 101  --    AST 21  --    ALT 16   --    ANIONGAP 8 11   ESTGFRAFRICA >60.0 >60.0   EGFRNONAA >60.0 >60.0   , CBC   Recent Labs  Lab 02/01/17  1257 02/02/17  0449   WBC 11.62 13.96*   HGB 13.6 12.9   HCT 39.0 36.9*    186     Radiology:   CT scan: CT ABDOMEN PELVIS WITH CONTRAST:   Results for orders placed or performed during the hospital encounter of 02/01/17   CT Abdomen Pelvis With Contrast    Narrative    Procedure comments: The patient was surveyed from the lung bases through the pelvis after the administration of 100 cc Omni 350 IV contrast as well as oral contrast and data was reconstructed for coronal, sagittal, and axial images.    Comparison: CT to study 07/07/2008. .    Findings:  The lung bases demonstrate mild dependent atelectasis.  No pleural effusion is seen.      The visualized portions of the heart appear normal.    The liver is normal in size and attenuation.  A subcentimeter hypodensity seen in the right hepatic lobe, too small to characterize.  The gallbladder is contains dependent radiopaque gallstones without wall thickening, pericholecystic fluid or inflammatory change.  No intrahepatic or extrahepatic biliary ductal dilatation is identified.    The spleen, pancreas, and left adrenal gland are unremarkable. Surgical clips are seen in the right renal fossa likely related to prior adrenalectomy in this patient with a given history of pheochromocytoma.  There is a small hiatal hernia.      The kidneys are normal in size and location. They concentrate and excrete contrast appropriately.  No hydronephrosis is seen.  The ureters appear normal in course and caliber without evidence of ureteral dilatation. The urinary bladder is unremarkable. An IUD is within the uterus.     The visualized loops of small and large bowel show no evidence of obstruction or inflammation.  There is a dilated tubular structure extending from the cecum extending into the pelvis, measuring up to 1.5 cm in diameter.  This structure contains an  air-fluid level without significant wall thickening or evidence of surrounding inflammatory change.    No ascites, free fluid, or intraperitoneal free air is identified.    There is no evidence of lymph node enlargement in the abdomen or pelvis.    The abdominal aorta is normal in course and caliber without significant atherosclerotic calcifications.    The osseous structures demonstrate no significant abnormality.      The extraperitoneal soft tissues demonstrates a midline anterior abdominal wall hernia containing unobstructed loops of small bowel.    Impression    1. Dilated tubular structure containing air-fluid level and measuring up to 1.5 cm in diameter is identified originating from the cecum.  In the absence of a history of appendectomy, this likely represents the appendix,  however, there is no evidence of wall thickening or surrounding inflammatory change to suggest acute appendicitis.  If symptoms develop in this region, this may present developing appendicitis and repeat CT examination could be performed..    2.  Cholelithiasis.    3.  Small hiatal hernia.    4.  Stable midline anterior abdominal wall hernia containing loops of unobstructed small bowel, similar to prior examination.     Report has been flagged in the EPIC medical record system.         ______________________________________     Electronically signed by resident: DELONTE PHAM MD  Date:     02/01/17  Time:    15:47            As the supervising and teaching physician, I personally reviewed the images and resident's interpretation and I agree with the findings.            Electronically signed by: Dr. Julian Tran MD  Date:     02/01/17  Time:    16:24         Final Diagnoses:   Principal Problem: Acute appendicitis with localized peritonitis   Secondary Diagnoses:   Active Hospital Problems    Diagnosis  POA    *Acute appendicitis with localized peritonitis [K35.3]  Yes    Acute appendicitis [K35.80]  Yes    Right lower quadrant pain  [R10.31]  Yes      Resolved Hospital Problems    Diagnosis Date Resolved POA   No resolved problems to display.       Discharged Condition: good    Disposition: Home or Self Care    Follow Up/Patient Instructions:     Medications:  Reconciled Home Medications:   Current Discharge Medication List      START taking these medications    Details   docusate sodium (COLACE) 50 MG capsule Take 1 capsule (50 mg total) by mouth 2 (two) times daily.  Refills: 0      oxycodone-acetaminophen (PERCOCET) 5-325 mg per tablet Take 1-2 tablets by mouth every 4 (four) hours as needed for Pain.  Qty: 60 tablet, Refills: 0         CONTINUE these medications which have NOT CHANGED    Details   levonorgestrel (MIRENA) 20 mcg/24 hr (5 years) IUD 1 each by Intrauterine route once.             Discharge Procedure Orders  Diet general     Lifting restrictions   Order Comments: No heavy lifting, nothing heavier than 10lbs     Call MD for:  difficulty breathing or increased cough     Call MD for:  redness, tenderness, or signs of infection (pain, swelling, redness, odor or green/yellow discharge around incision site)     Call MD for:  severe uncontrolled pain     Call MD for:  persistent nausea and vomiting or diarrhea     Call MD for:  temperature >100.4     Change dressing (specify)   Order Comments: Remove outer dressing tomorrow. Staples do not need to be covered. No bathing, swimming or soaking in a tub. Showering is okay.       Follow-up Information     Follow up with Mando Mccarthy MD In 2 weeks.    Specialty:  General Surgery    Why:  Wound check/Post-op appt scheduled on 2/17/17 at 10:45 AM.     Contact information:    Maximiliano MCKEON  [facility] [id number] [phone]

## 2017-02-02 NOTE — OP NOTE
DATE OF PROCEDURE:  02/01/2017    PREOPERATIVE DIAGNOSIS:  Acute appendicitis.    POSTOPERATIVE DIAGNOSIS:  Acute appendicitis.    PROCEDURE:  Open appendectomy.    SURGEON:  Mando Mccarthy M.D.    ASSISTANT:  BARB ROSENTHAL M.D. (RES)    ANESTHESIA:  General.    CLINICAL NOTE:  The patient is a 56-year-old female with clinical and   radiographic evidence of acute appendicitis.    PROCEDURE NOTE:  Following induction of adequate general anesthesia, Dalton   catheter was placed and the patient's abdomen was prepped and draped with   ChloraPrep.  We made a transverse right lower quadrant incision, dissected down   to the aponeurosis to the external oblique, which we opened transversely and a   muscle preserving fascia spread the abdominal musculature and we were able to   grasp the peritoneum and sharply incise it.  We entered the peritoneal cavity   easily took down some adhesions bluntly and we were able to mobilize an   extremely inflamed and enlarged appendix into the wound.  It had multiple   fecaliths and was normal at its base and junction with the cecum.  We came   across the appendix at its base and junction with the cecum with a NIR stapler   and then similarly came across the mesoappendix with a vascular load of the   Endo-NIR.  We then removed the specimen and sent it to Pathology.  We inspected   staple lines for hemostasis, which was adequate.  We irrigated the abdomen and   then we closed the peritoneum with running #1 PDS and we reapproximated the   muscle and closed the external oblique aponeurosis with running #1 PDS.  We then   closed the skin with staples.  Sterile dressings were then applied and the   procedure was terminated with the patient tolerating it well.    ESTIMATED BLOOD LOSS:  10 mL.      MCT/LYNETTE  dd: 02/01/2017 20:34:39 (CST)  td: 02/01/2017 23:11:23 (CST)  Doc ID   #[id number]  Job ID #[id number]    CC:

## 2017-02-02 NOTE — BRIEF OP NOTE
[facility]  Brief Operative Note    SUMMARY     Surgery Date: 2/1/2017     Surgeon(s) and Role:     * Trip Flood MD - Resident - Assisting     * Mando Mccarthy MD - Primary        Pre-op Diagnosis:  Acute appendicitis with localized peritonitis [K35.3]  Acute appendicitis [K35.80]    Post-op Diagnosis:  Post-Op Diagnosis Codes:     * Acute appendicitis with localized peritonitis [K35.3]     * Acute appendicitis [K35.80]    Procedure(s) (LRB):  APPENDECTOMY (N/A)    Anesthesia: General    Description of Procedure: Open appendectomy    Description of the findings of the procedure: Open appendectomy via RLQ Mat Trey incision.  Acute non-perforated appendicitis, sent for permanent.    Estimated Blood Loss: * No values recorded between 2/1/2017  7:51 PM and 2/1/2017  8:19 PM *         Specimens:   Specimen (12h ago through future)    Start     Ordered    02/01/17 2005  Specimen to Pathology - Surgery  Once     Comments:  1) appendix- perm    02/01/17 2004

## 2017-02-02 NOTE — TRANSFER OF CARE
"Anesthesia Transfer of Care Note    Patient: Sharifa Field    Procedure(s) Performed: Procedure(s) (LRB):  APPENDECTOMY (N/A)    Patient location: PACU    Anesthesia Type: general    Transport from OR: Transported from OR on 6-10 L/min O2 by face mask with adequate spontaneous ventilation    Post pain: adequate analgesia    Post assessment: no apparent anesthetic complications    Post vital signs: stable    Level of consciousness: sedated    Nausea/Vomiting: no nausea/vomiting    Complications: none          Last vitals:   Visit Vitals    /66 (BP Location: Right arm, Patient Position: Lying, BP Method: Automatic)    Pulse 64    Temp 36.6 °C (97.9 °F) (Axillary)    Resp 18    Ht 5' 8" (1.727 m)    Wt 82.6 kg (182 lb)    SpO2 100%    BMI 27.67 kg/m2     "

## 2017-02-02 NOTE — PLAN OF CARE
Problem: Patient Care Overview  Goal: Plan of Care Review  Outcome: Outcome(s) achieved Date Met:  02/02/17  Pt plan of care goals achieved. VSS. No signs of infection

## 2017-02-02 NOTE — NURSING TRANSFER
Nursing Transfer Note      2/1/2017     Transfer To: 527[address]    Transfer via stretcher    Transfer with room air    Transported by Pct    Medicines sent: Yes    Chart send with patient: Yes    Notified: spouse    Patient reassessed at: To be done by receiving nurse (date, time )

## 2017-02-02 NOTE — ANESTHESIA POSTPROCEDURE EVALUATION
"Anesthesia Post Evaluation    Patient: Sharifa Field    Procedure(s) Performed: Procedure(s) (LRB):  APPENDECTOMY (N/A)    Final Anesthesia Type: general  Patient location during evaluation: PACU  Patient participation: Yes- Able to Participate  Level of consciousness: awake and alert  Post-procedure vital signs: reviewed and stable  Pain management: adequate  Airway patency: patent  PONV status at discharge: No PONV  Anesthetic complications: no      Cardiovascular status: hemodynamically stable  Respiratory status: unassisted, spontaneous ventilation and room air  Hydration status: euvolemic  Follow-up not needed.        Visit Vitals    BP (!) 103/55    Pulse (!) 58    Temp 36.6 °C (97.9 °F) (Axillary)    Resp 13    Ht 5' 8" (1.727 m)    Wt 82.6 kg (182 lb)    SpO2 99%    BMI 27.67 kg/m2       Pain/Chriss Score: Pain Assessment Performed: Yes (2/1/2017  9:34 PM)  Presence of Pain: denies (2/1/2017  9:34 PM)  Pain Rating Prior to Med Admin: 6 (2/1/2017  9:07 PM)  Chriss Score: 10 (2/1/2017  9:34 PM)      "

## 2017-02-02 NOTE — PROGRESS NOTES
Pt settled in room,  no immediate needs, call light explained and given to patient, SCDs intact, I. S at bedside. BITE pain therapy menu, TV guide, pain control pamphlet, diet menu given, explained, and offered to patient

## 2017-02-02 NOTE — ANESTHESIA RELEASE NOTE
"Anesthesia Release from PACU Note    Patient: Sharifa Field    Procedure(s) Performed: Procedure(s) (LRB):  APPENDECTOMY (N/A)    Anesthesia type: general    Post pain: Adequate analgesia    Post assessment: no apparent anesthetic complications, tolerated procedure well and no evidence of recall    Last Vitals:   Visit Vitals    BP (!) 103/55    Pulse (!) 58    Temp 36.6 °C (97.9 °F) (Axillary)    Resp 13    Ht 5' 8" (1.727 m)    Wt 82.6 kg (182 lb)    SpO2 99%    BMI 27.67 kg/m2       Post vital signs: stable    Level of consciousness: awake, alert  and oriented    Nausea/Vomiting: no nausea/no vomiting    Complications: none    Airway Patency: patent    Respiratory: unassisted    Cardiovascular: stable and blood pressure at baseline    Hydration: euvolemic  "

## 2017-02-04 ENCOUNTER — PATIENT OUTREACH (OUTPATIENT)
Dept: ADMINISTRATIVE | Facility: CLINIC | Age: 57
End: 2017-02-04
Payer: COMMERCIAL

## 2017-02-04 NOTE — PATIENT INSTRUCTIONS
Discharge Instructions for Open Appendectomy (Appendix Removal)  You have had a procedure called an open appendectomy to remove your appendix. The appendix is a worm-shaped hollow pouch attached to the beginning of your large intestine. During an open appendectomy one incision (about 2 to 3 inches long) was made in your lower right side. A longer incision may have been used if the appendix ruptured. Here are guidelines to follow at home.  Incision care  Tips for taking care of your incision include the following:   · Wear loose-fitting clothes. This will help you be more comfortable and cause less irritation around your incision.  · Shower as usual.  · Gently wash around your incision with soap and water.  · Dont bathe or soak in a tub or swim in a pool until your incisions are well healed.  · If your incision was closed with small, white strips of tape, leave them in place for 10 days.  Diet   Diet tips after your appendix was removed:   · Drink 6 to 8 glasses of water a day, unless directed otherwise.  · Take a fiber-based laxative if you are constipated.  · Eat a bland, low-fat diet, such as:  ¨ Mashed potatoes  ¨ Plain toast or bread, crackers  ¨ Soup  ¨ Plain spaghetti  ¨ Rice  ¨ Macaroni (plain or with cheese)  ¨ Cottage cheese  ¨ Pudding  ¨ Low-fat yogurt  ¨ Low-fat milk  ¨ Canned fruit (except pineapple)  ¨ Very ripe bananas  Activity  Do's and don'ts for activities include:   · If you had general anesthesia, dont operate machinery or power tools, drink alcohol, or make major decisions for at least the first 24 hours.  · Gradually increase activity level to help with your recovery. Start by doing light activities around your home once you feel able to do so.  · Dont drive until you are no longer taking prescription pain medicine.  · Dont lift anything heavier than 10 pounds until your healthcare provider says its OK.  · Limit sports and strenuous activities for 1 or 2 weeks.     When to call your  healthcare provider  Call your healthcare provider right away if you have any of the following:  · Swelling, oozing, increased pain, or unusual redness around the incision  · Fever of 100.4°F (38°C) or higher, or as directed by your healthcare provider  · Increasing abdominal pain  · Severe diarrhea, bloating, or constipation  · Nausea or vomiting   Date Last Reviewed: 8/1/2016 © 2000-2016 [employer]. [address] [place] [address], [place] [address]. All rights reserved. This information is not intended as a substitute for professional medical care. Always follow your healthcare professional's instructions.        Constipation (Adult)  Constipation means that you have bowel movements that are less frequent than usual. Stools often become very hard and difficult to pass.  Constipation is very common. At some point in life it affects almost everyone. Since everyone's bowel habits are different, what is constipation to one person may not be to another. Your healthcare provider may do tests to diagnose constipation. It depends on what he or she finds when evaluating you.    Symptoms of constipation include:  · Abdominal pain  · Bloating  · Vomiting  · Painful bowel movements  · Itching, swelling, bleeding, or pain around the anus  Causes  Constipation can have many causes. These include:  · Diet low in fiber  · Too much dairy  · Not drinking enough liquids  · Lack of exercise or physical activity. This is especially true for older adults.  · Changes in lifestyle or daily routine, including pregnancy, aging, work, and travel  · Frequent use or misuse of laxatives  · Ignoring the urge to have a bowel movement or delaying it until later  · Medicines, such as certain prescription pain medicines, iron supplements, antacids, certain antidepressants, and calcium supplements  · Diseases like irritable bowel syndrome, bowel obstructions, stroke, diabetes, thyroid disease, Parkinson disease, hemorrhoids, and colon  cancer  Complications  Potential complications of constipation can include:  · Hemorrhoids  · Rectal bleeding from hemorrhoids or anal fissures (skin tears)  · Hernias  · Dependency on laxatives  · Chronic constipation  · Fecal impaction  · Bowel obstruction or perforation  Home care  All treatment should be done after talking with your healthcare provider. This is especially true if you have another medical problems, are taking prescription medicines, or are an older adult. Treatment most often involves lifestyle changes. You may also need medicines. Your healthcare provider will tell you which will work best for you. Follow the advice below to help avoid this problem in the future.  Lifestyle changes  These lifestyle changes can help prevent constipation:  · Diet. Eat a high-fiber diet, with fresh fruit and vegetables, and reduce dairy intake, meats, and processed foods  · Fluids. It's important to get enough fluids each day. Drink plenty of water when you eat more fiber. If you are on diet that limits the amount of fluid you can have, talk about this with your healthcare provider.  · Regular exercise. Check with your healthcare provider first.  Medications  Take any medicines as directed. Some laxatives are safe to use only every now and then. Others can be taken on a regular basis. Talk with your doctor or pharmacist if you have questions.  Prescription pain medicines can cause constipation. If you are taking this kind of medicine, ask your healthcare provider if you should also take a stool softener.  Medicines you may take to treat constipation include:  · Fiber supplements  · Stool softeners  · Laxatives  · Enemas  · Rectal suppositories  Follow-up care  Follow up with your healthcare provider if symptoms don't get better in the next few days. You may need to have more tests or see a specialist.  Call 911  Call 911 if any of these occur:  · Trouble breathing  · Stiff, rigid abdomen that is severely painful to  touch  · Confusion  · Fainting or loss of consciousness  · Rapid heart rate  · Chest pain  When to seek medical advice  Call your healthcare provider right away if any of these occur:  · Fever over 100.4°F (38°C)  · Failure to resume normal bowel movements  · Pain in your abdomen or back gets worse  · Nausea or vomiting  · Swelling in your abdomen  · Blood in the stool  · Black, tarry stool  · Involuntary weight loss  · Weakness  Date Last Reviewed: 12/30/2015 © 2000-2016 [employer]. [address] [place] [address], [place] [address]. All rights reserved. This information is not intended as a substitute for professional medical care. Always follow your healthcare professional's instructions.

## 2017-02-06 ENCOUNTER — NURSE TRIAGE (OUTPATIENT)
Dept: ADMINISTRATIVE | Facility: CLINIC | Age: 57
End: 2017-02-06

## 2017-02-06 NOTE — TELEPHONE ENCOUNTER
Reason for Disposition   No answer.  First attempt to contact caller.  Follow-up call scheduled within 15 minutes.    Protocols used: ST NO CONTACT OR DUPLICATE CONTACT CALL-A-AH

## 2017-02-09 ENCOUNTER — TELEPHONE (OUTPATIENT)
Dept: SURGERY | Facility: CLINIC | Age: 57
End: 2017-02-09

## 2017-02-09 NOTE — TELEPHONE ENCOUNTER
----- Message from Bahman Morse sent at 2/9/2017  8:21 AM CST -----  [phone]//pt states that she needs to speak with nurse in ref to having some pain around her incision site//please call//thank you

## 2017-02-09 NOTE — TELEPHONE ENCOUNTER
"Pt called to report that she has "black and blue" bruising along her incision line.  She is s/p Open Appendectomy from 2/1/17.  She denies pain, redness, warmth, drainage from the site, or fever.  She will monitor the incision for s/s of infection including fever, increasing pain at the site, redness, warmth, or drainage.  Appointment scheduled for 2/13/17 at 9:30am with Dr. Mccarthy for PO evaluation.  She is aware of appointment date, time, and location.  "

## 2017-02-13 ENCOUNTER — OFFICE VISIT (OUTPATIENT)
Dept: SURGERY | Facility: CLINIC | Age: 57
End: 2017-02-13
Payer: COMMERCIAL

## 2017-02-13 VITALS
DIASTOLIC BLOOD PRESSURE: 85 MMHG | HEART RATE: 72 BPM | SYSTOLIC BLOOD PRESSURE: 125 MMHG | HEIGHT: 69 IN | TEMPERATURE: 99 F | BODY MASS INDEX: 27.11 KG/M2 | WEIGHT: 183 LBS

## 2017-02-13 DIAGNOSIS — Z90.49 S/P APPENDECTOMY: Primary | ICD-10-CM

## 2017-02-13 PROCEDURE — 99024 POSTOP FOLLOW-UP VISIT: CPT | Mod: S$GLB,,, | Performed by: SURGERY

## 2017-02-13 PROCEDURE — 99999 PR PBB SHADOW E&M-EST. PATIENT-LVL III: CPT | Mod: PBBFAC,,, | Performed by: SURGERY

## 2017-02-13 NOTE — PROGRESS NOTES
S/p open appendectomy.      Doing well without complaints.    Incision healed,  Staples removed,    Plan  : f/u prn.  RTW  2/14/17

## 2017-03-07 ENCOUNTER — OFFICE VISIT (OUTPATIENT)
Dept: INTERNAL MEDICINE | Facility: CLINIC | Age: 57
End: 2017-03-07
Payer: COMMERCIAL

## 2017-03-07 ENCOUNTER — LAB VISIT (OUTPATIENT)
Dept: LAB | Facility: OTHER | Age: 57
End: 2017-03-07
Attending: INTERNAL MEDICINE
Payer: COMMERCIAL

## 2017-03-07 VITALS
OXYGEN SATURATION: 98 % | DIASTOLIC BLOOD PRESSURE: 80 MMHG | SYSTOLIC BLOOD PRESSURE: 116 MMHG | HEART RATE: 65 BPM | BODY MASS INDEX: 28.57 KG/M2 | HEIGHT: 68 IN | WEIGHT: 188.5 LBS

## 2017-03-07 DIAGNOSIS — Z00.00 ANNUAL PHYSICAL EXAM: ICD-10-CM

## 2017-03-07 DIAGNOSIS — M54.6 ACUTE BILATERAL THORACIC BACK PAIN: ICD-10-CM

## 2017-03-07 DIAGNOSIS — Z00.00 ANNUAL PHYSICAL EXAM: Primary | ICD-10-CM

## 2017-03-07 LAB
CHOLEST/HDLC SERPL: 5.8 {RATIO}
HDL/CHOLESTEROL RATIO: 17.1 %
HDLC SERPL-MCNC: 257 MG/DL
HDLC SERPL-MCNC: 44 MG/DL
LDLC SERPL CALC-MCNC: 186.8 MG/DL
NONHDLC SERPL-MCNC: 213 MG/DL
TRIGL SERPL-MCNC: 131 MG/DL

## 2017-03-07 PROCEDURE — 99999 PR PBB SHADOW E&M-EST. PATIENT-LVL III: CPT | Mod: PBBFAC,,, | Performed by: INTERNAL MEDICINE

## 2017-03-07 PROCEDURE — 36415 COLL VENOUS BLD VENIPUNCTURE: CPT

## 2017-03-07 PROCEDURE — 99396 PREV VISIT EST AGE 40-64: CPT | Mod: S$GLB,,, | Performed by: INTERNAL MEDICINE

## 2017-03-07 PROCEDURE — 80061 LIPID PANEL: CPT

## 2017-03-07 NOTE — PROGRESS NOTES
"Subjective:       Patient ID: Sharifa Field is a 56 y.o. female.    Chief Complaint: Annual Exam    HPI Comments:   Pt here for physical exam.     Pt c/o B thoracic back pain.  This has been present for weeks and occurs a few times per week.  It can occur any time but is more often present at night.  It is typically gone by morning.  This occurs approx 3x/wk.  It is mild, non-radiating, and not sharp, though she is unable to characterize it further.  She doesn't take anything for it.  She suspects it may be due to not drinking enough water.         Review of Systems   Constitutional: Negative.    HENT: Negative.    Eyes: Negative.    Respiratory: Negative.    Cardiovascular: Negative.    Gastrointestinal: Negative.    Genitourinary: Negative.    Musculoskeletal: Positive for back pain.   Skin: Negative.    Neurological: Negative.    Psychiatric/Behavioral: Negative.        Objective:       Vitals:    03/07/17 1128   BP: 116/80   Pulse: 65   SpO2: 98%   Weight: 85.5 kg (188 lb 7.9 oz)   Height: 5' 8" (1.727 m)     Physical Exam   Constitutional: She appears well-developed and well-nourished. No distress.   HENT:   Head: Normocephalic and atraumatic.   Right Ear: Tympanic membrane, external ear and ear canal normal.   Left Ear: Tympanic membrane, external ear and ear canal normal.   Mouth/Throat: Uvula is midline, oropharynx is clear and moist and mucous membranes are normal. No oropharyngeal exudate or posterior oropharyngeal erythema.   Eyes: Conjunctivae and EOM are normal. Pupils are equal, round, and reactive to light.   Neck: Normal range of motion. Neck supple.   Cardiovascular: Normal rate, regular rhythm and normal heart sounds.  Exam reveals no gallop and no friction rub.    No murmur heard.  Pulmonary/Chest: Effort normal and breath sounds normal. No respiratory distress. She has no wheezes. She has no rhonchi. She has no rales.   Musculoskeletal:        Thoracic back: She exhibits normal range of " motion, no tenderness and no bony tenderness.   Lymphadenopathy:     She has no cervical adenopathy.   Skin: She is not diaphoretic.       Assessment:       1. Annual physical exam    2. Acute bilateral thoracic back pain        Plan:            - Update labs.      Thoracic back pain - No red flags.  Appears to be muscular.  OTC meds PRN.

## 2017-03-07 NOTE — MR AVS SNAPSHOT
"    [facility]  [address]  [place] [address]  Phone: [phone]  Fax: [phone]                  Sharifa Field   3/7/2017 11:20 AM   Office Visit    Description:  Female : 1960   Provider:  Agustin Hernández MD   Department:  [facility]           Reason for Visit     Annual Exam           Diagnoses this Visit        Comments    Annual physical exam    -  Primary     Acute bilateral thoracic back pain                To Do List           Goals (5 Years of Data)     None      Follow-Up and Disposition     Return if symptoms worsen or fail to improve.      [facility]     [facility] Care [facility] -  Assistance  Registered nurses in the [facility] provide clinical advisement, health education, appointment booking, and other advisory services.  Call for this free service at [phone].             Medications           Message regarding Medications     Verify the changes and/or additions to your medication regime listed below are the same as discussed with your clinician today.  If any of these changes or additions are incorrect, please notify your healthcare provider.             Verify that the below list of medications is an accurate representation of the medications you are currently taking.  If none reported, the list may be blank. If incorrect, please contact your healthcare provider. Carry this list with you in case of emergency.           Current Medications     levonorgestrel (MIRENA) 20 mcg/24 hr (5 years) IUD 1 each by Intrauterine route once.    docusate sodium (COLACE) 50 MG capsule Take 1 capsule (50 mg total) by mouth 2 (two) times daily.    oxycodone-acetaminophen (PERCOCET) 5-325 mg per tablet TK 1 TO 2 TS PO Q 4 H PRN P           Clinical Reference Information           Your Vitals Were     BP Pulse Height Weight SpO2 BMI    116/80 65 5' 8" (1.727 m) 85.5 kg (188 lb 7.9 oz) 98% 28.66 kg/m2      Blood " Pressure          Most Recent Value    BP  116/80      Allergies as of 3/7/2017     No Known Allergies      Immunizations Administered on Date of Encounter - 3/7/2017     None      Orders Placed During Today's Visit     Future Labs/Procedures Expected by Expires    Lipid panel  3/7/2017 5/6/2018      [facility] Sign-Up     Activating your [facility] account is as easy as 1-2-3!     1) Visit [email or web address], select Sign Up Now, enter this activation code and your date of birth, then select Next.  [email or web address]  Expires: 3/19/2017  1:37 PM      2) Create a username and password to use when you visit [facility] in the future and select a security question in case you lose your password and select Next.    3) Enter your e-mail address and click Sign Up!    Additional Information  If you have questions, please e-mail [email or web address] or call [phone] to talk to our [facility] staff. Remember, [facility] is NOT to be used for urgent needs. For medical emergencies, dial 911.         Language Assistance Services     ATTENTION: Language assistance services are available, free of charge. Please call [phone].      ATENCIÓN: Si habla clarisa, tiene a escamilla disposición servicios gratuitos de asistencia lingüística. Llame al [phone].     CHÚ Ý: N?u b?n nói Ti?ng Vi?t, có các d?ch v? h? tr? ngôn ng? mi?n phí dành cho b?n. G?i s? [phone].         [facility] complies with applicable Federal civil rights laws and does not discriminate on the basis of race, color, national origin, age, disability, or sex.

## 2018-06-08 ENCOUNTER — OFFICE VISIT (OUTPATIENT)
Dept: PRIMARY CARE CLINIC | Facility: CLINIC | Age: 58
End: 2018-06-08
Payer: COMMERCIAL

## 2018-06-08 VITALS
BODY MASS INDEX: 27.22 KG/M2 | HEART RATE: 77 BPM | TEMPERATURE: 98 F | WEIGHT: 183.75 LBS | HEIGHT: 69 IN | DIASTOLIC BLOOD PRESSURE: 73 MMHG | SYSTOLIC BLOOD PRESSURE: 115 MMHG | OXYGEN SATURATION: 99 % | RESPIRATION RATE: 14 BRPM

## 2018-06-08 DIAGNOSIS — Z00.00 ROUTINE PHYSICAL EXAMINATION: Primary | ICD-10-CM

## 2018-06-08 DIAGNOSIS — E78.00 PURE HYPERCHOLESTEROLEMIA: ICD-10-CM

## 2018-06-08 PROCEDURE — 99214 OFFICE O/P EST MOD 30 MIN: CPT | Mod: S$GLB,,, | Performed by: NURSE PRACTITIONER

## 2018-06-08 PROCEDURE — 99999 PR PBB SHADOW E&M-EST. PATIENT-LVL III: CPT | Mod: PBBFAC,,, | Performed by: NURSE PRACTITIONER

## 2018-06-08 RX ORDER — ATORVASTATIN CALCIUM 10 MG/1
10 TABLET, FILM COATED ORAL DAILY
Qty: 90 TABLET | Refills: 3 | Status: SHIPPED | OUTPATIENT
Start: 2018-06-08 | End: 2018-06-08

## 2018-06-08 RX ORDER — ATORVASTATIN CALCIUM 10 MG/1
10 TABLET, FILM COATED ORAL DAILY
Qty: 30 TABLET | Refills: 0 | Status: SHIPPED | OUTPATIENT
Start: 2018-06-08 | End: 2019-06-05

## 2018-06-08 NOTE — PROGRESS NOTES
Subjective:       Patient ID: Sharifa Field is a 57 y.o. female.    Chief Complaint: Establish Care (Physical for insurance). Patient with a h/o hyperlipidemia, pheochromocytoma, appendicitis. No complaints at this time. Patient is a non-smoker, occasional EtOH.     Hyperlipidemia - previous lab work in 3/2017 illustrating pure hypercholesterolemia. Elevated cholesterol evident on lab work for past 10+ years. Has never been placed on a statin. No know h/o CAD. Denies chest pain, palpitations.     Pheochromocytoma - h/o resection 5 years ago with resolution of symptoms.    Dental:every 6 months. Due at this time.   Eye:  Pap: h/o abnormal pap a year ago - scheduled for 6/11/18 Dr. Deleon   Mammogram: maternal aunt - due mammogram   Immunizations: unsure of last tetanus vaccination.   Colonscopy: UTD 2 years ago no h/o of polyps  Diet:regular, high fat.  Exercise:none regular.  Work: [employer]           Review of Systems   Constitutional: Negative for chills, fatigue and fever.   HENT: Negative for congestion, rhinorrhea, sinus pressure and sore throat.    Respiratory: Negative for cough, chest tightness and shortness of breath.    Cardiovascular: Negative for chest pain and palpitations.   Gastrointestinal: Negative for blood in stool, diarrhea, nausea and vomiting.   Genitourinary: Negative for dysuria, frequency, hematuria and urgency.   Musculoskeletal: Negative for arthralgias and back pain.   Skin: Negative for rash and wound.   Neurological: Positive for headaches. Negative for dizziness.   Psychiatric/Behavioral: Negative for dysphoric mood and sleep disturbance. The patient is not nervous/anxious.        Objective:      Physical Exam   Constitutional: She is oriented to person, place, and time. She appears well-developed and well-nourished.   HENT:   Head: Normocephalic.   Right Ear: Tympanic membrane normal.   Left Ear: Tympanic membrane normal.   Mouth/Throat: Uvula is midline,  oropharynx is clear and moist and mucous membranes are normal. Tonsils are 1+ on the right. Tonsils are 1+ on the left. No tonsillar exudate.   Eyes: Conjunctivae are normal.   Cardiovascular: Normal rate, regular rhythm, normal heart sounds and normal pulses.    No murmur heard.  Pulses:       Radial pulses are 2+ on the right side, and 2+ on the left side.   No LE swelling noted   Pulmonary/Chest: Effort normal and breath sounds normal. She has no wheezes.   Abdominal: Soft. Bowel sounds are normal. There is no tenderness.   Musculoskeletal: She exhibits no edema.   Lymphadenopathy:     She has no cervical adenopathy.   Neurological: She is alert and oriented to person, place, and time.   Skin: Skin is warm and dry. No rash noted.   Psychiatric: She has a normal mood and affect.       Lab Results   Component Value Date    WBC 13.96 (H) 02/02/2017    HGB 12.9 02/02/2017    HCT 36.9 (L) 02/02/2017     02/02/2017    CHOL 257 (H) 03/07/2017    TRIG 131 03/07/2017    HDL 44 03/07/2017    ALT 16 02/01/2017    AST 21 02/01/2017     02/02/2017    K 4.1 02/02/2017     02/02/2017    CREATININE 0.9 02/02/2017    BUN 11 02/02/2017    CO2 22 (L) 02/02/2017    TSH 2.455 09/08/2015    HGBA1C 5.4 10/10/2013       Assessment:       No diagnosis found.    Plan:       There are no diagnoses linked to this encounter.  Medication List with Changes/Refills   Current Medications    DOCUSATE SODIUM (COLACE) 50 MG CAPSULE    Take 1 capsule (50 mg total) by mouth 2 (two) times daily.    LEVONORGESTREL (MIRENA) 20 MCG/24 HR (5 YEARS) IUD    1 each by Intrauterine route once.    OXYCODONE-ACETAMINOPHEN (PERCOCET) 5-325 MG PER TABLET    TK 1 TO 2 TS PO Q 4 H PRN P       Sharifa was seen today for establish care.    Diagnoses and all orders for this visit:    Routine physical examination  -     CBC auto differential; Future  -     Comprehensive metabolic panel; Future  -     TSH; Future  -     T4, free; Future  -     T3;  Future    Pure hypercholesterolemia  -     Lipid panel; Future  -     atorvastatin (LIPITOR) 10 MG tablet; Take 1 tablet (10 mg total) by mouth once daily. I have discussed the common side effects of this medication and black box warnings (if applicable to this medication) with the patient and answered all of the questions they had at the time of this visit regarding this medication.    I have reviewed the patient's past medical/surgical and social histories and updated as appropriate. Medications were reviewed and discussed as appropriate including side effects and risks versus benefit.     Plan of care was reviewed and agreed upon with the patient.  An opportunity to ask questions was provided and explanation given. Patient verbalized understanding on all information reviewed and discussed.  The patient will follow up at his/her routinely scheduled appointment with PCP or sooner if needed. If symptoms worsen patient may call for ASAP appointment or report to the emergency department for further evaluation.       Follow up in 1 week to discuss lab work results and plan of care.

## 2018-06-11 ENCOUNTER — OFFICE VISIT (OUTPATIENT)
Dept: OBSTETRICS AND GYNECOLOGY | Facility: CLINIC | Age: 58
End: 2018-06-11
Attending: OBSTETRICS & GYNECOLOGY
Payer: COMMERCIAL

## 2018-06-11 ENCOUNTER — HOSPITAL ENCOUNTER (OUTPATIENT)
Dept: RADIOLOGY | Facility: HOSPITAL | Age: 58
Discharge: HOME | End: 2018-06-11
Attending: OBSTETRICS & GYNECOLOGY
Payer: COMMERCIAL

## 2018-06-11 VITALS
BODY MASS INDEX: 27.6 KG/M2 | WEIGHT: 186.31 LBS | HEIGHT: 69 IN | SYSTOLIC BLOOD PRESSURE: 116 MMHG | DIASTOLIC BLOOD PRESSURE: 82 MMHG

## 2018-06-11 DIAGNOSIS — Z12.31 VISIT FOR SCREENING MAMMOGRAM: ICD-10-CM

## 2018-06-11 DIAGNOSIS — Z01.419 WELL WOMAN EXAM: Primary | ICD-10-CM

## 2018-06-11 DIAGNOSIS — Z01.419 WELL WOMAN EXAM: ICD-10-CM

## 2018-06-11 DIAGNOSIS — Z30.432 ENCOUNTER FOR IUD REMOVAL: ICD-10-CM

## 2018-06-11 PROCEDURE — 77067 SCR MAMMO BI INCL CAD: CPT | Mod: 26,,, | Performed by: RADIOLOGY

## 2018-06-11 PROCEDURE — 88175 CYTOPATH C/V AUTO FLUID REDO: CPT

## 2018-06-11 PROCEDURE — 77063 BREAST TOMOSYNTHESIS BI: CPT | Mod: 26,,, | Performed by: RADIOLOGY

## 2018-06-11 PROCEDURE — 87624 HPV HI-RISK TYP POOLED RSLT: CPT

## 2018-06-11 PROCEDURE — 58301 REMOVE INTRAUTERINE DEVICE: CPT | Mod: S$GLB,,, | Performed by: OBSTETRICS & GYNECOLOGY

## 2018-06-11 PROCEDURE — 77067 SCR MAMMO BI INCL CAD: CPT | Mod: TC

## 2018-06-11 PROCEDURE — 99999 PR PBB SHADOW E&M-EST. PATIENT-LVL III: CPT | Mod: PBBFAC,,, | Performed by: OBSTETRICS & GYNECOLOGY

## 2018-06-11 PROCEDURE — 99396 PREV VISIT EST AGE 40-64: CPT | Mod: 25,S$GLB,, | Performed by: OBSTETRICS & GYNECOLOGY

## 2018-06-11 NOTE — PROGRESS NOTES
"CC: Well woman exam    Sharifa Field is a 57 y.o. female  presents for well woman exam.  LMP: No LMP recorded. Patient has had an implant..  No issues, problems, or complaints.   She has had a Mirena x 8 years.  She desires removal.  No vaginal bleeding.    Pap  normal.      Past Medical History:   Diagnosis Date    Genital warts     H/O pheochromocytoma      Past Surgical History:   Procedure Laterality Date    ABDOMINAL ADHESION SURGERY      APPENDECTOMY N/A 2017    Procedure: APPENDECTOMY;  Surgeon: Mando Mccarthy MD;  [facility];  Service: General;  Laterality: N/A;    CERVICAL CONIZATION   W/ LASER      COLONOSCOPY N/A 2016    Procedure: COLONOSCOPY;  Surgeon: MIRNA Hawk MD;  Location: [facility] ENDO (4TH FLR);  Service: Endoscopy;  Laterality: N/A;    TUMOR REMOVAL       Social History     Social History    Marital status:      Spouse name: N/A    Number of children: N/A    Years of education: N/A     Occupational History     [employer]     Social History Main Topics    Smoking status: Never Smoker    Smokeless tobacco: Never Used    Alcohol use No    Drug use: No    Sexual activity: Yes     Partners: Male     Birth control/ protection: IUD      Comment: Mirena IUD inserted 4-5 years ago     Other Topics Concern    Not on file     Social History Narrative    Lives w/.     Family History   Problem Relation Age of Onset    Diabetes Mother     Stroke Mother     Heart disease Father     Hypertension Father     Cancer Maternal Aunt     Breast cancer Cousin     Colon cancer Neg Hx     Ovarian cancer Neg Hx      OB History      Para Term  AB Living    2 2       2    SAB TAB Ectopic Multiple Live Births                       /82   Ht 5' 9" (1.753 m)   Wt 84.5 kg (186 lb 4.6 oz)   BMI 27.51 kg/m²       ROS:  GENERAL: Denies weight gain or weight loss. Feeling well overall.   SKIN: Denies rash or lesions. "   HEAD: Denies head injury or headache.   NODES: Denies enlarged lymph nodes.   CHEST: Denies chest pain or shortness of breath.   CARDIOVASCULAR: Denies palpitations or left sided chest pain.   ABDOMEN: No abdominal pain, constipation, diarrhea, nausea, vomiting or rectal bleeding.   URINARY: No frequency, dysuria, hematuria, or burning on urination.  REPRODUCTIVE: See HPI.   BREASTS: The patient performs breast self-examination and denies pain, lumps, or nipple discharge.   HEMATOLOGIC: No easy bruisability or excessive bleeding.   MUSCULOSKELETAL: Denies joint pain or swelling.   NEUROLOGIC: Denies syncope or weakness.   PSYCHIATRIC: Denies depression, anxiety or mood swings.    PHYSICAL EXAM:  APPEARANCE: Well nourished, well developed, in no acute distress.  AFFECT: WNL, alert and oriented x 3  SKIN: No acne or hirsutism  NECK: Neck symmetric without masses or thyromegaly  NODES: No inguinal, cervical, axillary, or femoral lymph node enlargement  CHEST: Good respiratory effect  ABDOMEN: Soft.  No tenderness or masses.  No hepatosplenomegaly.  No hernias.  BREASTS: Symmetrical, no skin changes or visible lesions.  No palpable masses, nipple discharge bilaterally.  PELVIC: Normal external genitalia without lesions.  Normal hair distribution.  Adequate perineal body, normal urethral meatus.  Vagina moist and well rugated without lesions or discharge.  Cervix pink, without lesions, discharge or tenderness.  No significant cystocele or rectocele.  Bimanual exam shows uterus to be normal size, regular, mobile and nontender.  Adnexa without masses or tenderness.    EXTREMITIES: No edema.    ASSESSMENT    ICD-10-CM ICD-9-CM    1. Well woman exam Z01.419 V72.31 HPV High Risk Genotypes, PCR      Liquid-based pap smear, screening      Mammo Digital Screening Bilat with Tomosynthesis CAD   2. Encounter for IUD removal Z30.432 V25.12          PLAN:  Well woman exam  -     HPV High Risk Genotypes, PCR  -     Liquid-based pap  smear, screening  -     Mammo Digital Screening Bilat with Tomosynthesis CAD; Future; Expected date: 06/11/2018    Encounter for IUD removal      Patient was counseled today on A.C.S. Pap guidelines and recommendations for yearly pelvic exams, mammograms and monthly self breast exams; to see her PCP for other health maintenance.     PROCEDURE: Removal of   Mirena    PRE-IUD REMOVAL COUNSELING:  The patient was advised of minimal risks of bleeding and pain and she agrees to proceed.    PREGNANCY TEST:  negative    PROCEDURE:  TIME OUT PERFORMED.  IUD strings were visualized at the os and grasped. IUD removed with gentle traction.  The patient tolerated the procedure well      POST IUD REMOVAL COUNSELING:  Expect period-like flow to occur after  IUD removal and periods to return to pre-IUD pattern.  Manage post IUD removal cramping with NSAIDs, Tylenol or Rx per [employer].    POST IUD REMOVAL CONTRACEPTION: none    Counseling lasted approximately 15 minutes and all her questions were answered.    FOLLOW-UP: With me for annual gyn exam or prn.

## 2018-06-12 ENCOUNTER — HOSPITAL ENCOUNTER (OUTPATIENT)
Dept: RADIOLOGY | Facility: HOSPITAL | Age: 58
Discharge: HOME | End: 2018-06-12
Attending: OBSTETRICS & GYNECOLOGY
Payer: COMMERCIAL

## 2018-06-12 ENCOUNTER — TELEPHONE (OUTPATIENT)
Dept: RADIOLOGY | Facility: HOSPITAL | Age: 58
End: 2018-06-12

## 2018-06-12 DIAGNOSIS — R92.8 ABNORMAL MAMMOGRAM: ICD-10-CM

## 2018-06-12 PROCEDURE — 77065 DX MAMMO INCL CAD UNI: CPT | Mod: 26,,, | Performed by: RADIOLOGY

## 2018-06-12 PROCEDURE — 77061 BREAST TOMOSYNTHESIS UNI: CPT | Mod: 26,,, | Performed by: RADIOLOGY

## 2018-06-12 PROCEDURE — 77065 DX MAMMO INCL CAD UNI: CPT | Mod: TC,PO

## 2018-06-12 PROCEDURE — 77061 BREAST TOMOSYNTHESIS UNI: CPT | Mod: TC,PO

## 2018-06-12 NOTE — TELEPHONE ENCOUNTER
Spoke with patient and explained mammogram findings.Patient expressed understanding of results. Patient is scheduled for a abnormal mammogram follow up appointment at [facility] on 6/12/18

## 2018-06-13 ENCOUNTER — PATIENT MESSAGE (OUTPATIENT)
Dept: OBSTETRICS AND GYNECOLOGY | Facility: CLINIC | Age: 58
End: 2018-06-13

## 2018-06-13 DIAGNOSIS — R92.8 ABNORMAL SCREENING MAMMOGRAM: Primary | ICD-10-CM

## 2018-06-14 ENCOUNTER — TELEPHONE (OUTPATIENT)
Dept: OBSTETRICS AND GYNECOLOGY | Facility: CLINIC | Age: 58
End: 2018-06-14

## 2018-06-14 LAB
HPV HR 12 DNA CVX QL NAA+PROBE: NEGATIVE
HPV16 AG SPEC QL: NEGATIVE
HPV18 DNA SPEC QL NAA+PROBE: NEGATIVE

## 2018-06-14 NOTE — TELEPHONE ENCOUNTER
Hi Dr. Smith,     Thanks for your concern....so happy it was negative.   I have an appointment Friday with my Primary NP Susy to go over my other screening.  I like this messaging and being able to see everything is so wonderful.     Have a great evening!     Sharifa Field

## 2018-06-15 ENCOUNTER — OFFICE VISIT (OUTPATIENT)
Dept: PRIMARY CARE CLINIC | Facility: CLINIC | Age: 58
End: 2018-06-15
Payer: COMMERCIAL

## 2018-06-15 VITALS
WEIGHT: 184.44 LBS | TEMPERATURE: 98 F | HEIGHT: 69 IN | RESPIRATION RATE: 12 BRPM | HEART RATE: 79 BPM | OXYGEN SATURATION: 98 % | SYSTOLIC BLOOD PRESSURE: 123 MMHG | DIASTOLIC BLOOD PRESSURE: 80 MMHG | BODY MASS INDEX: 27.32 KG/M2

## 2018-06-15 DIAGNOSIS — E78.00 PURE HYPERCHOLESTEROLEMIA: Primary | ICD-10-CM

## 2018-06-15 PROCEDURE — 99999 PR PBB SHADOW E&M-EST. PATIENT-LVL IV: CPT | Mod: PBBFAC,,, | Performed by: NURSE PRACTITIONER

## 2018-06-15 PROCEDURE — 99213 OFFICE O/P EST LOW 20 MIN: CPT | Mod: S$GLB,,, | Performed by: NURSE PRACTITIONER

## 2018-06-15 PROCEDURE — 3008F BODY MASS INDEX DOCD: CPT | Mod: CPTII,S$GLB,, | Performed by: NURSE PRACTITIONER

## 2018-06-15 NOTE — PROGRESS NOTES
Chief Complaint  Chief Complaint   Patient presents with    Follow-up 1 week       HPI  Sharifa Field is a 57 y.o. female with multiple medical diagnoses as listed in the medical history and problem list that presents for lab work follow-up.    Patient presents for lab work follow-up.  Lab work drawn last week illustrating pure hypercholesterolemia with an LDL increasing to the 190s.  Previous lab work in her early months 2017 with similar labs.  Patient has never been treated with a statin in the past.  Started last week on Lipitor 10 mg daily.  Patient is tolerating medication without any noted side effects.  Although the lab work within normal limits.  Slightly low blood sugar at the time of fasting lab work.  Patient denies symptoms of hypoglycemia this time.  Reports high sugar, high fat diet at this time.  Recent weight gain of 20 lb since she moved to the area approximately a year ago.  No regular exercise routine at this time.  Patient without a family history of hypercholesterolemia.  Denies chest pain, palpitations.    PAST MEDICAL HISTORY:  Past Medical History:   Diagnosis Date    Genital warts     H/O pheochromocytoma        PAST SURGICAL HISTORY:  Past Surgical History:   Procedure Laterality Date    ABDOMINAL ADHESION SURGERY      APPENDECTOMY N/A 2/1/2017    Procedure: APPENDECTOMY;  Surgeon: Mando Mccarthy MD;  Location: [address];  Service: General;  Laterality: N/A;    CERVICAL CONIZATION   W/ LASER      COLONOSCOPY N/A 8/1/2016    Procedure: COLONOSCOPY;  Surgeon: MIRNA Hawk MD;  Location: [facility]  Service: Endoscopy;  Laterality: N/A;    TUMOR REMOVAL  2004       SOCIAL HISTORY:  Social History     Social History    Marital status:      Spouse name: N/A    Number of children: N/A    Years of education: N/A     Occupational History     [employer]     Social History Main Topics    Smoking status: Never Smoker    Smokeless tobacco:  "Never Used    Alcohol use No    Drug use: No    Sexual activity: Yes     Partners: Male     Birth control/ protection: IUD      Comment: Mirena IUD inserted 4-5 years ago     Other Topics Concern    Not on file     Social History Narrative    Lives w/.       FAMILY HISTORY:  Family History   Problem Relation Age of Onset    Diabetes Mother     Stroke Mother     Heart disease Father     Hypertension Father     Cancer Maternal Aunt     Breast cancer Cousin     Colon cancer Neg Hx     Ovarian cancer Neg Hx        ALLERGIES AND MEDICATIONS: updated and reviewed.  Review of patient's allergies indicates:  No Known Allergies  Current Outpatient Prescriptions   Medication Sig Dispense Refill    atorvastatin (LIPITOR) 10 MG tablet Take 1 tablet (10 mg total) by mouth once daily. 30 tablet 0     No current facility-administered medications for this visit.          ROS  Review of Systems   Constitutional: Negative for chills, fatigue and fever.   HENT: Negative for congestion, rhinorrhea, sinus pressure and sore throat.    Respiratory: Negative for cough, chest tightness and shortness of breath.    Cardiovascular: Negative for chest pain and palpitations.   Gastrointestinal: Negative for blood in stool, diarrhea, nausea and vomiting.   Genitourinary: Negative for dysuria, frequency, hematuria and urgency.   Musculoskeletal: Negative for arthralgias and back pain.   Skin: Negative for rash and wound.   Neurological: Negative for dizziness and headaches.   Psychiatric/Behavioral: Negative for dysphoric mood and sleep disturbance. The patient is not nervous/anxious.          PHYSICAL EXAM  Vitals:    06/15/18 0816   BP: 123/80   BP Location: Right arm   Patient Position: Sitting   BP Method: X-Large (Automatic)   Pulse: 79   Resp: 12   Temp: 97.8 °F (36.6 °C)   TempSrc: Oral   SpO2: 98%   Weight: 83.7 kg (184 lb 6.6 oz)   Height: 5' 9" (1.753 m)    Body mass index is 27.23 kg/m².  Weight: 83.7 kg (184 lb 6.6 " "oz)   Height: 5' 9" (175.3 cm)     Physical Exam   Constitutional: She is oriented to person, place, and time. She appears well-developed and well-nourished.   HENT:   Head: Normocephalic.   Right Ear: Tympanic membrane normal.   Left Ear: Tympanic membrane normal.   Mouth/Throat: Uvula is midline, oropharynx is clear and moist and mucous membranes are normal.   Eyes: Conjunctivae are normal.   Cardiovascular: Normal rate, regular rhythm, normal heart sounds and normal pulses.    No murmur heard.  Pulses:       Radial pulses are 2+ on the right side, and 2+ on the left side.   Pulmonary/Chest: Effort normal and breath sounds normal. She has no wheezes.   Abdominal: Soft. Bowel sounds are normal. There is no tenderness.   Musculoskeletal: She exhibits no edema.   Lymphadenopathy:     She has no cervical adenopathy.   Neurological: She is alert and oriented to person, place, and time.   Skin: Skin is warm and dry. No rash noted.   Psychiatric: She has a normal mood and affect.         Health Maintenance       Date Due Completion Date    TETANUS VACCINE 10/16/1977 ---    Influenza Vaccine 08/01/2018 ---    Mammogram 06/12/2020 6/12/2018    Pap Smear with HPV Cotest 06/11/2021 6/11/2018    Lipid Panel 06/08/2023 6/8/2018    Colonoscopy 08/01/2026 8/1/2016            Assessment & Plan    Sharfia was seen today for follow-up 1 week.    Diagnoses and all orders for this visit:    Pure hypercholesterolemia  -     COMPREHENSIVE METABOLIC PANEL; Future  -     Lipid panel; Future    Discussed with patient the importance of low sugar, high-protein diet and weight loss and reduction of LDL and total cholesterol level.  Discussed that, through limitation of sugar and increasing protein in her diet, this can contribute to weight loss and overall improvement in her cholesterol.  Regular exercise routine would be beneficial at this time including 30 min of moderately paced exercise 5 times per week.  Patient expressed interest in " dietary changes and initiation of an exercise routine.    Recheck lab work in 3 months to evaluate for improvement in cholesterol and liver function while on a statin.      Follow-up: Follow-up in about 3 months (around 9/15/2018) for Repeat fasting lab work in 3 months..

## 2019-05-30 ENCOUNTER — OFFICE VISIT (OUTPATIENT)
Dept: URGENT CARE | Facility: CLINIC | Age: 59
End: 2019-05-30
Payer: COMMERCIAL

## 2019-05-30 VITALS
OXYGEN SATURATION: 99 % | SYSTOLIC BLOOD PRESSURE: 129 MMHG | TEMPERATURE: 99 F | DIASTOLIC BLOOD PRESSURE: 85 MMHG | BODY MASS INDEX: 29.03 KG/M2 | RESPIRATION RATE: 16 BRPM | HEART RATE: 88 BPM | WEIGHT: 196 LBS | HEIGHT: 69 IN

## 2019-05-30 DIAGNOSIS — L03.115 CELLULITIS OF RIGHT LOWER EXTREMITY: Primary | ICD-10-CM

## 2019-05-30 DIAGNOSIS — S89.91XA INJURY OF RIGHT LOWER EXTREMITY, INITIAL ENCOUNTER: ICD-10-CM

## 2019-05-30 PROCEDURE — 73610 X-RAY EXAM OF ANKLE: CPT | Mod: RT,S$GLB,, | Performed by: RADIOLOGY

## 2019-05-30 PROCEDURE — 73610 XR ANKLE COMPLETE 3 VIEW RIGHT: ICD-10-PCS | Mod: RT,S$GLB,, | Performed by: RADIOLOGY

## 2019-05-30 PROCEDURE — 99214 PR OFFICE/OUTPT VISIT, EST, LEVL IV, 30-39 MIN: ICD-10-PCS | Mod: S$GLB,,, | Performed by: NURSE PRACTITIONER

## 2019-05-30 PROCEDURE — 99214 OFFICE O/P EST MOD 30 MIN: CPT | Mod: S$GLB,,, | Performed by: NURSE PRACTITIONER

## 2019-05-30 RX ORDER — CLINDAMYCIN HYDROCHLORIDE 300 MG/1
300 CAPSULE ORAL 4 TIMES DAILY
Qty: 40 CAPSULE | Refills: 0 | Status: SHIPPED | OUTPATIENT
Start: 2019-05-30 | End: 2019-06-05
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
## 2019-05-30 NOTE — PATIENT INSTRUCTIONS
Start antibiotic and take as directed with food until completion.  Ice to the area as directed.  Keep elevated as directed above the level of the heart.  If not better in the next 48-72 hrs or certainly if worse recheck for US.  If rapidly worse then go to the ER (fever, calf pain, shortness of breath, chest pain, rapidly spreading redness).  Schedule close follow up with your PCP.      Cellulitis  Cellulitis is an infection of the deep layers of skin. A break in the skin, such as a cut or scratch, can let bacteria under the skin. If the bacteria get to deep layers of the skin, it can be serious. If not treated, cellulitis can get into the bloodstream and lymph nodes. The infection can then spread throughout the body. This causes serious illness.  Cellulitis causes the affected skin to become red, swollen, warm, and sore. The reddened areas have a visible border. An open sore may leak fluid (pus). You may have a fever, chills, and pain.  Cellulitis is treated with antibiotics taken for 7 to 10 days. An open sore may be cleaned and covered with cool wet gauze. Symptoms should get better 1 to 2 days after treatment is started. Make sure to take all the antibiotics for the full number of days until they are gone. Keep taking the medicine even if your symptoms go away.  Home care  Follow these tips:  · Limit the use of the part of your body with cellulitis.   · If the infection is on your leg, keep your leg raised while sitting. This will help to reduce swelling.  · Take all of the antibiotic medicine exactly as directed until it is gone. Do not miss any doses, especially during the first 7 days. Dont stop taking the medicine when your symptoms get better.  · Keep the affected area clean and dry.  · Wash your hands with soap and warm water before and after touching your skin. Anyone else who touches your skin should also wash his or her hands. Don't share towels.  Follow-up care  Follow up with your healthcare  provider, or as advised. If your infection does not go away on the first antibiotic, your healthcare provider will prescribe a different one.  When to seek medical advice  Call your healthcare provider right away if any of these occur:  · Red areas that spread  · Swelling or pain that gets worse  · Fluid leaking from the skin (pus)  · Fever higher of 100.4º F (38.0º C) or higher after 2 days on antibiotics  Date Last Reviewed: 9/1/2016  © 5820-1885 [employer]. [address] [place] [address], [place] [address]. All rights reserved. This information is not intended as a substitute for professional medical care. Always follow your healthcare professional's instructions.

## 2019-06-03 ENCOUNTER — OFFICE VISIT (OUTPATIENT)
Dept: PRIMARY CARE CLINIC | Facility: CLINIC | Age: 59
End: 2019-06-03
Attending: NURSE PRACTITIONER
Payer: COMMERCIAL

## 2019-06-03 VITALS
DIASTOLIC BLOOD PRESSURE: 65 MMHG | HEART RATE: 84 BPM | TEMPERATURE: 99 F | WEIGHT: 194.25 LBS | RESPIRATION RATE: 18 BRPM | SYSTOLIC BLOOD PRESSURE: 116 MMHG | HEIGHT: 69 IN | OXYGEN SATURATION: 95 % | BODY MASS INDEX: 28.77 KG/M2

## 2019-06-03 DIAGNOSIS — Z23 NEED FOR TETANUS BOOSTER: ICD-10-CM

## 2019-06-03 DIAGNOSIS — M79.661 PAIN AND SWELLING OF RIGHT LOWER LEG: Primary | ICD-10-CM

## 2019-06-03 DIAGNOSIS — M79.89 PAIN AND SWELLING OF RIGHT LOWER LEG: Primary | ICD-10-CM

## 2019-06-03 DIAGNOSIS — S80.11XD CONTUSION OF RIGHT LOWER LEG, SUBSEQUENT ENCOUNTER: ICD-10-CM

## 2019-06-03 DIAGNOSIS — L03.115 CELLULITIS OF RIGHT LOWER EXTREMITY: ICD-10-CM

## 2019-06-03 PROBLEM — K35.80 ACUTE APPENDICITIS: Status: RESOLVED | Noted: 2017-02-01 | Resolved: 2019-06-03

## 2019-06-03 PROBLEM — K35.30 ACUTE APPENDICITIS WITH LOCALIZED PERITONITIS: Status: RESOLVED | Noted: 2017-02-01 | Resolved: 2019-06-03

## 2019-06-03 PROBLEM — S80.11XA CONTUSION OF RIGHT LOWER LEG: Status: ACTIVE | Noted: 2019-06-03

## 2019-06-03 PROBLEM — R10.31 RIGHT LOWER QUADRANT PAIN: Status: RESOLVED | Noted: 2017-02-01 | Resolved: 2019-06-03

## 2019-06-03 PROCEDURE — 3008F PR BODY MASS INDEX (BMI) DOCUMENTED: ICD-10-PCS | Mod: CPTII,S$GLB,, | Performed by: NURSE PRACTITIONER

## 2019-06-03 PROCEDURE — 99214 PR OFFICE/OUTPT VISIT, EST, LEVL IV, 30-39 MIN: ICD-10-PCS | Mod: 25,S$GLB,, | Performed by: NURSE PRACTITIONER

## 2019-06-03 PROCEDURE — 3008F BODY MASS INDEX DOCD: CPT | Mod: CPTII,S$GLB,, | Performed by: NURSE PRACTITIONER

## 2019-06-03 PROCEDURE — 99999 PR PBB SHADOW E&M-EST. PATIENT-LVL IV: ICD-10-PCS | Mod: PBBFAC,,, | Performed by: NURSE PRACTITIONER

## 2019-06-03 PROCEDURE — 99999 PR PBB SHADOW E&M-EST. PATIENT-LVL IV: CPT | Mod: PBBFAC,,, | Performed by: NURSE PRACTITIONER

## 2019-06-03 PROCEDURE — 90471 TDAP VACCINE GREATER THAN OR EQUAL TO 7YO IM: ICD-10-PCS | Mod: S$GLB,,, | Performed by: NURSE PRACTITIONER

## 2019-06-03 PROCEDURE — 90715 TDAP VACCINE GREATER THAN OR EQUAL TO 7YO IM: ICD-10-PCS | Mod: S$GLB,,, | Performed by: NURSE PRACTITIONER

## 2019-06-03 PROCEDURE — 90471 IMMUNIZATION ADMIN: CPT | Mod: S$GLB,,, | Performed by: NURSE PRACTITIONER

## 2019-06-03 PROCEDURE — 99214 OFFICE O/P EST MOD 30 MIN: CPT | Mod: 25,S$GLB,, | Performed by: NURSE PRACTITIONER

## 2019-06-03 PROCEDURE — 90715 TDAP VACCINE 7 YRS/> IM: CPT | Mod: S$GLB,,, | Performed by: NURSE PRACTITIONER

## 2019-06-03 RX ORDER — IBUPROFEN 800 MG/1
800 TABLET ORAL
Qty: 30 TABLET | Refills: 0 | Status: SHIPPED | OUTPATIENT
Start: 2019-06-03 | End: 2021-10-27

## 2019-06-03 RX ORDER — IBUPROFEN 200 MG
200 TABLET ORAL EVERY 6 HOURS PRN
COMMUNITY
End: 2019-06-03

## 2019-06-03 NOTE — LETTER
Chasity 3, 2019      [facility]  [address], [address]  [facility] [address]  Phone: [phone]  Fax: [phone]       Patient: Sharifa Field   YOB: 1960  Date of Visit: 06/03/2019    To Whom It May Concern:    Yulia Field  was at [facility] on 06/03/2019. She may return to work on 6/4/19 if diagnostics are without acute findings.  At this time patient must avoid weight bearing and elevate leg.  If you have any questions or concerns, or if I can be of further assistance, please do not hesitate to contact me.    Sincerely,      Franchesca Jean DNP, NP-C

## 2019-06-03 NOTE — PROGRESS NOTES
Patient identified by name and date of birth, denies any allergies. Immunization administered as ordered by aseptic technique tolerated well by pt.
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
## 2019-06-03 NOTE — PATIENT INSTRUCTIONS
Lower Extremity Contusion  You have a contusion (bruise) of a lower extremity (leg, knee, ankle, foot, or toe). Symptoms include pain, swelling, and skin discoloration. No bones are broken. This injury may take from a few days to a few weeks to heal.  During that time, the bruise may change from reddish in color, to purple-blue, to green-yellow, to yellow-brown.  Home care  · Unless another medicine was prescribed, you can take acetaminophen, ibuprofen, or naproxen to control pain. (If you have chronic liver or kidney disease or ever had a stomach ulcer or gastrointestinal bleeding, talk with your doctor before using these medicines.)  · Elevate the injured area to reduce pain and swelling. As much as possible, sit or lie down with the injured area raised about the level of your heart. This is especially important during the first 48 hours.  · Ice the injured area to help reduce pain and swelling. Wrap a cold source (ice pack or ice cubes in a plastic bag) in a thin towel. Apply to the bruised area for 20 minutes every 1 to 2 hours the first day. Continue this 3 to 4 times a day until the pain and swelling goes away.  · If crutches have been advised, do not bear full weight on the injured leg until you can do so without pain. You may return to sports when you are able to put full weight and impact on the injured leg without pain.  Follow up  Follow up with your healthcare provider or our staff as advised. Call if you are not improving within the next 1 to 2 weeks.  When to seek medical advice   Call your healthcare provider right away if any of these occur:  · Increased pain or swelling  · Foot or toes become cold, blue, numb or tingly  · Signs of infection: Warmth, drainage, or increased redness or pain around the injury  · Inability to move the injured area   · Frequent bruising for unknown reasons  Date Last Reviewed: 2/1/2017  © 3819-8372 [employer]. [address] [place] [address], [place] [address].  All rights reserved. This information is not intended as a substitute for professional medical care. Always follow your healthcare professional's instructions.        R.I.C.E.    R.I.C.E. stands for Rest, Ice, Compression, and Elevation. Doing these things helps limit pain and swelling after an injury. R.I.C.E. also helps injuries heal faster. Use R.I.C.E. for sprains, strains, and severe bruises or bumps. Follow the tips on this handout and begin R.I.C.E. as soon as possible after an injury.  ? Rest  Pain is your bodys way of telling you to rest an injured area. Whether you have hurt an elbow, hand, foot, or knee, limiting its use will prevent further injury and help you heal.  ? Ice  Applying ice right after an injury helps prevent swelling and reduce pain. Dont place ice directly on your skin.  · Wrap a cold pack or bag of ice in a thin cloth. Place it over the injured area.  · Ice for 10 minutes every 3 hours. Dont ice for more than 20 minutes at a time.  ? Compression  Putting pressure (compression) on an injury helps prevent swelling and provides support.  · Wrap the injured area firmly with an elastic bandage. If your hand or foot tingles, becomes discolored, or feels cold to the touch, the bandage may be too tight. Rewrap it more loosely.  · If your bandage becomes too loose, rewrap it.  · Do not wear an elastic bandage overnight.  ? Elevation  Keeping an injury elevated helps reduce swelling, pain, and throbbing. Elevation is most effective when the injury is kept elevated higher than the heart.     Call your healthcare provider if you notice any of the following:  · Fingers or toes feel numb, are cold to the touch, or change color  · Skin looks shiny or tight  · Pain, swelling, or bruising worsens and is not improved with elevation   Date Last Reviewed: 9/3/2015  © 9387-8006 [employer]. [address] [place] [address], [place] [address]. All rights reserved. This information is not intended as a  substitute for professional medical care. Always follow your healthcare professional's instructions.

## 2019-06-04 DIAGNOSIS — M79.661 PAIN AND SWELLING OF RIGHT LOWER LEG: Primary | ICD-10-CM

## 2019-06-04 DIAGNOSIS — M79.89 PAIN AND SWELLING OF RIGHT LOWER LEG: Primary | ICD-10-CM

## 2019-06-04 DIAGNOSIS — S80.11XD CONTUSION OF RIGHT LOWER LEG, SUBSEQUENT ENCOUNTER: ICD-10-CM

## 2019-06-05 ENCOUNTER — OFFICE VISIT (OUTPATIENT)
Dept: ORTHOPEDICS | Facility: CLINIC | Age: 59
End: 2019-06-05
Attending: NURSE PRACTITIONER
Payer: COMMERCIAL

## 2019-06-05 VITALS — BODY MASS INDEX: 29.18 KG/M2 | WEIGHT: 192.56 LBS | HEIGHT: 68 IN

## 2019-06-05 DIAGNOSIS — L95.9 VASCULITIS OF SKIN: Primary | ICD-10-CM

## 2019-06-05 DIAGNOSIS — L03.115 CELLULITIS OF RIGHT LOWER EXTREMITY: ICD-10-CM

## 2019-06-05 PROCEDURE — 99999 PR PBB SHADOW E&M-EST. PATIENT-LVL III: ICD-10-PCS | Mod: PBBFAC,,, | Performed by: NURSE PRACTITIONER

## 2019-06-05 PROCEDURE — 99214 OFFICE O/P EST MOD 30 MIN: CPT | Mod: S$GLB,,, | Performed by: NURSE PRACTITIONER

## 2019-06-05 PROCEDURE — 3008F PR BODY MASS INDEX (BMI) DOCUMENTED: ICD-10-PCS | Mod: CPTII,S$GLB,, | Performed by: NURSE PRACTITIONER

## 2019-06-05 PROCEDURE — 99214 PR OFFICE/OUTPT VISIT, EST, LEVL IV, 30-39 MIN: ICD-10-PCS | Mod: S$GLB,,, | Performed by: NURSE PRACTITIONER

## 2019-06-05 PROCEDURE — 99999 PR PBB SHADOW E&M-EST. PATIENT-LVL III: CPT | Mod: PBBFAC,,, | Performed by: NURSE PRACTITIONER

## 2019-06-05 PROCEDURE — 3008F BODY MASS INDEX DOCD: CPT | Mod: CPTII,S$GLB,, | Performed by: NURSE PRACTITIONER

## 2019-06-05 RX ORDER — SULFAMETHOXAZOLE AND TRIMETHOPRIM 800; 160 MG/1; MG/1
1 TABLET ORAL 2 TIMES DAILY
Qty: 20 TABLET | Refills: 0 | Status: SHIPPED | OUTPATIENT
Start: 2019-06-05 | End: 2019-06-15

## 2019-06-05 RX ORDER — METHYLPREDNISOLONE 4 MG/1
TABLET ORAL
Qty: 1 PACKAGE | Refills: 0 | Status: SHIPPED | OUTPATIENT
Start: 2019-06-05 | End: 2019-06-12

## 2019-06-05 NOTE — LETTER
June 5, 2019      Franchesca Jean, OLAYINKA, NP-C  [address]  [address]  [facility] [address]           [facility]  [address]  [facility] [address]  Phone: [phone]          Patient: Sharifa Field   MR Number: [id number]   YOB: 1960   Date of Visit: 6/5/2019       Dear Franchesca Jean:    Thank you for referring Sharifa Field to me for evaluation. Attached you will find relevant portions of my assessment and plan of care.    If you have questions, please do not hesitate to call me. I look forward to following Sharifa Field along with you.    Sincerely,    Binu Romero NP    Enclosure  CC:  No Recipients    If you would like to receive this communication electronically, please contact [email or web address] or [phone] to request more information on [employer] access.    For providers and/or their staff who would like to refer a patient to [facility], please contact us through our one-stop-shop provider referral line, [facility], at [phone].    If you feel you have received this communication in error or would no longer like to receive these types of communications, please e-mail [email or web address]
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
## 2019-06-12 ENCOUNTER — OFFICE VISIT (OUTPATIENT)
Dept: ORTHOPEDICS | Facility: CLINIC | Age: 59
End: 2019-06-12
Payer: COMMERCIAL

## 2019-06-12 ENCOUNTER — TELEPHONE (OUTPATIENT)
Dept: ADMINISTRATIVE | Facility: HOSPITAL | Age: 59
End: 2019-06-12

## 2019-06-12 ENCOUNTER — OFFICE VISIT (OUTPATIENT)
Dept: PRIMARY CARE CLINIC | Facility: CLINIC | Age: 59
End: 2019-06-12
Attending: NURSE PRACTITIONER
Payer: COMMERCIAL

## 2019-06-12 VITALS
BODY MASS INDEX: 29.16 KG/M2 | HEART RATE: 83 BPM | DIASTOLIC BLOOD PRESSURE: 74 MMHG | WEIGHT: 192.38 LBS | SYSTOLIC BLOOD PRESSURE: 108 MMHG | HEIGHT: 68 IN

## 2019-06-12 VITALS
DIASTOLIC BLOOD PRESSURE: 82 MMHG | WEIGHT: 191.94 LBS | HEIGHT: 67 IN | SYSTOLIC BLOOD PRESSURE: 110 MMHG | TEMPERATURE: 98 F | RESPIRATION RATE: 18 BRPM | OXYGEN SATURATION: 97 % | HEART RATE: 80 BPM | BODY MASS INDEX: 30.12 KG/M2

## 2019-06-12 DIAGNOSIS — L03.115 CELLULITIS OF RIGHT LOWER EXTREMITY: Primary | ICD-10-CM

## 2019-06-12 DIAGNOSIS — M79.661 PAIN AND SWELLING OF RIGHT LOWER LEG: ICD-10-CM

## 2019-06-12 DIAGNOSIS — Z12.31 BREAST CANCER SCREENING BY MAMMOGRAM: ICD-10-CM

## 2019-06-12 DIAGNOSIS — M79.89 PAIN AND SWELLING OF RIGHT LOWER LEG: ICD-10-CM

## 2019-06-12 DIAGNOSIS — Z00.00 ANNUAL PHYSICAL EXAM: ICD-10-CM

## 2019-06-12 DIAGNOSIS — E78.00 PURE HYPERCHOLESTEROLEMIA: ICD-10-CM

## 2019-06-12 DIAGNOSIS — E66.01 MORBID OBESITY: ICD-10-CM

## 2019-06-12 DIAGNOSIS — Z13.220 SCREENING FOR LIPID DISORDERS: ICD-10-CM

## 2019-06-12 PROCEDURE — 99999 PR PBB SHADOW E&M-EST. PATIENT-LVL III: CPT | Mod: PBBFAC,,, | Performed by: NURSE PRACTITIONER

## 2019-06-12 PROCEDURE — 99999 PR PBB SHADOW E&M-EST. PATIENT-LVL III: ICD-10-PCS | Mod: PBBFAC,,, | Performed by: NURSE PRACTITIONER

## 2019-06-12 PROCEDURE — 3008F BODY MASS INDEX DOCD: CPT | Mod: CPTII,S$GLB,, | Performed by: NURSE PRACTITIONER

## 2019-06-12 PROCEDURE — 99213 PR OFFICE/OUTPT VISIT, EST, LEVL III, 20-29 MIN: ICD-10-PCS | Mod: S$GLB,,, | Performed by: NURSE PRACTITIONER

## 2019-06-12 PROCEDURE — 99499 NO LOS: ICD-10-PCS | Mod: S$GLB,,, | Performed by: NURSE PRACTITIONER

## 2019-06-12 PROCEDURE — 99213 OFFICE O/P EST LOW 20 MIN: CPT | Mod: S$GLB,,, | Performed by: NURSE PRACTITIONER

## 2019-06-12 PROCEDURE — 99499 UNLISTED E&M SERVICE: CPT | Mod: S$GLB,,, | Performed by: NURSE PRACTITIONER

## 2019-06-12 PROCEDURE — 3008F PR BODY MASS INDEX (BMI) DOCUMENTED: ICD-10-PCS | Mod: CPTII,S$GLB,, | Performed by: NURSE PRACTITIONER

## 2019-06-12 PROCEDURE — 99999 PR PBB SHADOW E&M-EST. PATIENT-LVL II: CPT | Mod: PBBFAC,,, | Performed by: NURSE PRACTITIONER

## 2019-06-12 PROCEDURE — 99999 PR PBB SHADOW E&M-EST. PATIENT-LVL II: ICD-10-PCS | Mod: PBBFAC,,, | Performed by: NURSE PRACTITIONER

## 2019-06-12 RX ORDER — ATORVASTATIN CALCIUM 40 MG/1
40 TABLET, FILM COATED ORAL DAILY
Qty: 90 TABLET | Refills: 3 | Status: SHIPPED | OUTPATIENT
Start: 2019-06-12 | End: 2021-11-03 | Stop reason: SDUPTHER

## 2019-06-12 NOTE — TELEPHONE ENCOUNTER
Based off last lab results, patient was started on Lipitor. I do not see where medication was sent to pharmacy. Please advise.
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
## 2019-06-12 NOTE — PATIENT INSTRUCTIONS
Medicine for Cholesterol Control  Cholesterol is a waxy substance in your bloodstream. If there is too much of it in your blood, it can build up in the walls of your arteries. Over time, this buildup can lead to coronary disease. Coronary disease can put you at risk for a heart attack or stroke. It can also put you at risk for disease of the arteries in your legs and other places in your body. Medicine can give you the extra help you need to control your cholesterol.    How medicine helps  Different kinds of medicines help with cholesterol levels. Some help lower your LDL (bad cholesterol). Some help raise your HDL (good cholesterol). Other medicines lower your triglyceride levels. And some do all three. It may take some time to find the right medicine for you. Taking medicine will be only one part of your cholesterol control plan. You will still need to eat right and get regular exercise.  Talk with your healthcare provider to find out your risks for having a heart attack. Your provider can tell you what goals to use to see if your treatment is working. These goals may vary based on your health issues or family history. Also ask your provider how often your cholesterol should be checked as part of your treatment plan. You may need to fast before getting your cholesterol checked.  Taking your medicine  It is important to:  · Tell your healthcare provider about any other medicines you take. This includes over-the-counter medicines. It also includes vitamins and herbs.  · Take your medicine exactly as directed. This helps make sure that it works as it should.  · Don't skip a dose.  · Don't stop taking it if you feel better.  · Don't stop taking it when your cholesterol numbers improve.  · Order your refill before your medicine runs out.  Side effects  Medicines can cause side effects. These often occur at the start of taking a new medicine. Side effects can include headache and upset stomach. Rarely you can have  muscle aches. Tell your healthcare provider about any side effects you have.  When to call your healthcare provider  When taking your medicine, let your healthcare provider know if you have:  · Yellowing of the whites of eyes  · Blurred vision  · Muscle aches  · Trouble breathing   High-risk groups  Some people may need to take medicines called statins to control their cholesterol. This is in addition to eating a healthy diet and getting regular exercise.  Statins can help you stay healthy. They can also help prevent a heart attack or stroke. You may need to take a statin if you are in one of these groups:  · Adults who have had a heart attack or stroke. Or adults who have had peripheral vascular disease, a ministroke (transient ischemic attack), or stable or unstable angina. This group also includes people who have had a procedure to restore blood flow through a blocked artery. These procedures include percutaneous coronary intervention, angioplasty, stent, and open-heart bypass surgery.  · Adults who have diabetes. Or adults who are at higher risk of having a heart attack or stroke and have an LDL cholesterol level of 70 to 189 mg/dL  · Adults who are 21 years old or older and have an LDL cholesterol level of 190 mg/dL or higher.  If you are in a high-risk group, talk with your healthcare provider about your treatment goals. Make sure you understand why these goals are important, based on your own health history and your family history of heart disease or high cholesterol.  Make a plan to have regular cholesterol checks. You may need to fast before getting this test. Also ask your provider about any side effects your medicines may cause. Let your provider know about any side effects you have. You may need to take more than one medicine to reach the cholesterol goals that you and your provider decide on.  Date Last Reviewed: 10/1/2016  © 1170-3554 [employer]. [address] [place] [address], [place]  [address]. All rights reserved. This information is not intended as a substitute for professional medical care. Always follow your healthcare professional's instructions.

## 2019-06-12 NOTE — PROGRESS NOTES
Subjective:      Patient ID: Sharifa Field is a 58 y.o. female.    Chief Complaint: Follow-up of the Right Lower Leg    HPI Patient presents for follow up regarding her right lower redness.  She was seen by me last week.  Since her last visit, she has no sustained any injuries including falls.  She has not had any fever or chills.  She reports since starting Bactrim DS, her redness and pain has actually improved.  She is now able to weight through her foot without pain.      Review of Systems   Constitution: Negative.   HENT: Negative.    Cardiovascular: Negative.    Respiratory: Negative.    Endocrine: Negative.    Skin: Positive for color change and rash.   Musculoskeletal: Positive for falls.   Gastrointestinal: Negative.    Genitourinary: Negative.    Neurological: Negative.    Psychiatric/Behavioral: Negative.          Objective:      General    Constitutional: She is oriented to person, place, and time. She appears well-developed and well-nourished. No distress.   HENT:   Head: Atraumatic.   Eyes: Conjunctivae are normal. Pupils are equal, round, and reactive to light.   Cardiovascular:   Right pedal pulse present x 2   Pulmonary/Chest: Effort normal. No respiratory distress.   Neurological: She is alert and oriented to person, place, and time.   Psychiatric: She has a normal mood and affect. Her behavior is normal. Judgment and thought content normal.     General Musculoskeletal Exam   Gait: normal     Right Ankle/Foot Exam     Inspection   Erythema: present    Range of Motion   The patient has normal right ankle ROM.    Muscle Strength   The patient has normal right ankle strength.    Comments:  Marked improvement in the erythema to her right lower leg.  There is a quarter size lump to center that is still somewhat sensitive to touch.  The area is not warm.  There is no drainage.          Vascular Exam     Right Pulses  Dorsalis Pedis:      2+  Posterior Tibial:      2+                  Assessment:        Encounter Diagnosis   Name Primary?    Cellulitis of right lower extremity Yes          Plan:       Sharifa was seen today for follow-up.    Diagnoses and all orders for this visit:    Cellulitis of right lower extremity      -Patient presents for follow up for right lower leg cellulitis.  -Erythema has improved with Bactrim but not completely resolved.  There is approximately a quarter size lump noted to center of redness.  No drainage present.  -Denies fever.  -Recommend to follow up with PCP for continue care.  -Previous extensive workup by urgent care negative.  Recent x-ray of right ankle, tib-fib negative for fracture and ultrasound negative for DVT.  -Follow up in Ortho PRN      Future Appointments   Date Time Provider Department Center   6/12/2019  1:15 PM Franchesca Jean, OLAYINKA, [facility]

## 2019-06-13 VITALS
SYSTOLIC BLOOD PRESSURE: 108 MMHG | HEIGHT: 68 IN | DIASTOLIC BLOOD PRESSURE: 74 MMHG | HEART RATE: 83 BPM | BODY MASS INDEX: 29.14 KG/M2 | WEIGHT: 192.25 LBS

## 2019-06-13 NOTE — PROGRESS NOTES
Subjective:      Patient ID: Sharifa Field is a 58 y.o. female.    Chief Complaint: Follow-up of the Right Lower Leg    HPI Patient presents for follow up regarding her right lower redness.  She was seen by me last week.  Since her last visit, she has no sustained any injuries including falls.  She has not had any fever or chills.  She reports since starting Bactrim DS, her redness and pain has actually improved.  She is now able to weight through her foot without pain.      Review of Systems   Constitution: Negative.   HENT: Negative.    Cardiovascular: Negative.    Respiratory: Negative.    Endocrine: Negative.    Skin: Positive for color change and rash.   Musculoskeletal: Positive for falls.   Gastrointestinal: Negative.    Genitourinary: Negative.    Neurological: Negative.    Psychiatric/Behavioral: Negative.          Objective:      General  Vitals:    06/13/19 1414   BP: 108/74   Pulse: 83       Constitutional: She is oriented to person, place, and time. She appears well-developed and well-nourished. No distress.   HENT:   Head: Atraumatic.   Eyes: Conjunctivae are normal. Pupils are equal, round, and reactive to light.   Cardiovascular:   Right pedal pulse present x 2   Pulmonary/Chest: Effort normal. No respiratory distress.   Neurological: She is alert and oriented to person, place, and time.   Psychiatric: She has a normal mood and affect. Her behavior is normal. Judgment and thought content normal.     General Musculoskeletal Exam   Gait: normal     Right Ankle/Foot Exam     Inspection   Erythema: present    Range of Motion   The patient has normal right ankle ROM.    Muscle Strength   The patient has normal right ankle strength.    Comments:  Marked improvement in the erythema to her right lower leg.  There is a quarter size lump to center that is still somewhat sensitive to touch.  The area is not warm.  There is no drainage.          Vascular Exam     Right Pulses  Dorsalis Pedis:       2+  Posterior Tibial:      2+                  Assessment:       Encounter Diagnosis   Name Primary?    Cellulitis of right lower extremity Yes          Plan:       Sharifa was seen today for follow-up.    Diagnoses and all orders for this visit:    Cellulitis of right lower extremity      -Patient presents for follow up for right lower leg cellulitis.  -Erythema has improved with Bactrim but not completely resolved.  There is approximately a quarter size lump noted to center of redness.  No drainage present.  -Denies fever.  -Recommend to follow up with PCP for continue care.  -Previous extensive workup by urgent care negative.  Recent x-ray of right ankle, tib-fib negative for fracture and ultrasound negative for DVT.  -Follow up in Ortho PRN      Future Appointments   Date Time Provider Department Center   6/12/2019  1:15 PM Franchesca Jean DNP, NP-C [facility]

## 2019-06-17 ENCOUNTER — OFFICE VISIT (OUTPATIENT)
Dept: PRIMARY CARE CLINIC | Facility: CLINIC | Age: 59
End: 2019-06-17
Attending: NURSE PRACTITIONER
Payer: COMMERCIAL

## 2019-06-17 VITALS
RESPIRATION RATE: 18 BRPM | HEIGHT: 67 IN | WEIGHT: 192.13 LBS | DIASTOLIC BLOOD PRESSURE: 54 MMHG | SYSTOLIC BLOOD PRESSURE: 116 MMHG | HEART RATE: 77 BPM | BODY MASS INDEX: 30.16 KG/M2 | TEMPERATURE: 99 F | OXYGEN SATURATION: 99 %

## 2019-06-17 DIAGNOSIS — L53.9 ERYTHEMA: ICD-10-CM

## 2019-06-17 DIAGNOSIS — L29.9 ITCHING: ICD-10-CM

## 2019-06-17 DIAGNOSIS — T78.40XA ALLERGIC REACTION, INITIAL ENCOUNTER: Primary | ICD-10-CM

## 2019-06-17 DIAGNOSIS — L03.115 CELLULITIS OF RIGHT LOWER EXTREMITY: ICD-10-CM

## 2019-06-17 PROCEDURE — 99214 OFFICE O/P EST MOD 30 MIN: CPT | Mod: 25,S$GLB,, | Performed by: NURSE PRACTITIONER

## 2019-06-17 PROCEDURE — 96372 THER/PROPH/DIAG INJ SC/IM: CPT | Mod: S$GLB,,, | Performed by: NURSE PRACTITIONER

## 2019-06-17 PROCEDURE — 99999 PR PBB SHADOW E&M-EST. PATIENT-LVL IV: ICD-10-PCS | Mod: PBBFAC,,, | Performed by: NURSE PRACTITIONER

## 2019-06-17 PROCEDURE — 3008F BODY MASS INDEX DOCD: CPT | Mod: CPTII,S$GLB,, | Performed by: NURSE PRACTITIONER

## 2019-06-17 PROCEDURE — 99999 PR PBB SHADOW E&M-EST. PATIENT-LVL IV: CPT | Mod: PBBFAC,,, | Performed by: NURSE PRACTITIONER

## 2019-06-17 PROCEDURE — 96372 PR INJECTION,THERAP/PROPH/DIAG2ST, IM OR SUBCUT: ICD-10-PCS | Mod: S$GLB,,, | Performed by: NURSE PRACTITIONER

## 2019-06-17 PROCEDURE — 3008F PR BODY MASS INDEX (BMI) DOCUMENTED: ICD-10-PCS | Mod: CPTII,S$GLB,, | Performed by: NURSE PRACTITIONER

## 2019-06-17 PROCEDURE — 99214 PR OFFICE/OUTPT VISIT, EST, LEVL IV, 30-39 MIN: ICD-10-PCS | Mod: 25,S$GLB,, | Performed by: NURSE PRACTITIONER

## 2019-06-17 RX ORDER — DIPHENHYDRAMINE HYDROCHLORIDE 50 MG/ML
25 INJECTION INTRAMUSCULAR; INTRAVENOUS ONCE
Status: COMPLETED | OUTPATIENT
Start: 2019-06-17 | End: 2019-06-17

## 2019-06-17 RX ORDER — DEXAMETHASONE SODIUM PHOSPHATE 4 MG/ML
8 INJECTION, SOLUTION INTRA-ARTICULAR; INTRALESIONAL; INTRAMUSCULAR; INTRAVENOUS; SOFT TISSUE ONCE
Status: COMPLETED | OUTPATIENT
Start: 2019-06-17 | End: 2019-06-17

## 2019-06-17 RX ORDER — DIPHENHYDRAMINE HCL 25 MG
25 CAPSULE ORAL EVERY 6 HOURS PRN
Refills: 0 | COMMUNITY
Start: 2019-06-17 | End: 2021-10-27

## 2019-06-17 RX ADMIN — DEXAMETHASONE SODIUM PHOSPHATE 8 MG: 4 INJECTION, SOLUTION INTRA-ARTICULAR; INTRALESIONAL; INTRAMUSCULAR; INTRAVENOUS; SOFT TISSUE at 08:06

## 2019-06-17 RX ADMIN — DIPHENHYDRAMINE HYDROCHLORIDE 25 MG: 50 INJECTION INTRAMUSCULAR; INTRAVENOUS at 08:06

## 2019-06-17 NOTE — PATIENT INSTRUCTIONS
General Allergic Reactions  An allergic reaction is a set of symptoms caused by an allergen. An allergen is something that causes a persons immune system to react. When a person comes in contact with an allergen, it causes the body to release chemicals. These include the chemical histamine. Histamine causes swelling and itching. It may affect the entire body. This is called a general allergic reaction. Often symptoms affect only 1 part of the body. This is called a local allergic reaction.  You are having an allergic reaction. Almost anything can cause one. Different people are allergic to different things. It is usually something that you ate or swallowed, came into contact with by getting or putting it on your skin or clothes, or something you breathed in the air. This can be very annoying and sometimes scary.  Most of us think of allergic reactions when we have a rash or itchy skin. Symptoms can include:  · Itching of the eyes, nose, and roof of the mouth  · Runny or stuffy nose  · Watery eyes   · Sneezing or coughing   · A blocked feeling in the ear  · Red, itchy rash called hives  · Red and purple spots  · Rash, redness, welts, blisters  · Itching, burning, stinging, pain  · Dry, flaky, cracking, scaly skin  Severe symptoms include:  · Swelling of the face, lips, or other parts of the body  · Hoarse voice  · Trouble swallowing, feeling like your throat is closing  · Trouble breathing, wheezing  · Nausea, vomiting, diarrhea, stomach cramps  · Feeling faint or lightheaded, rapid heart rate  Sometimes the cause may be obvious. But there are so many things that can cause a reaction that you may not be able to figure out. The most important things to help find your allergen are:  · Remembering when it started  · What you were doing at the time or just before that  · Any activities you were involved in  · Any new products or contacts  Below are some common causes. But remember that almost anything can cause a  reaction. You may not even be aware that you came into contact with one of these things:  · Dust, mold, pollen  · Plants (common ones are poison ivy and poison oak, but there are many others)   · Animals  · Foods such as shrimp, shellfish, peanuts, milk products, gluten, and eggs. Also food colorings, flavorings, and additives.  · Insect bites or stings such as bees, mosquitos, fleas, ticks  · Medicines such as penicillin, sulfa medicines, amoxicillin, aspirin, and ibuprofen. But any medicine can cause a reaction.  · Jewelry such as nickel or gold. This can be new, or something youve worn for a while, including zippers and buttons.  · Latex such as in gloves, clothes, toys, balloons, or some tapes. Some people allergic to latex may also have problems with foods like bananas, avocados, kiwi, papaya, or chestnuts.  · Lotions, perfumes, cosmetics, soaps, shampoos, skincare products, nail products  · Chemicals or dyes in clothing, linen, , hair dyes, soaps, iodine  Many viruses and common colds can cause a rash that is not an allergic reaction. Sometimes it is hard to tell the difference between allergies, sensitivity, or an intolerance to something. This is especially true with food. Many things can cause diarrhea, vomiting, stomach cramps, and skin irritation.  Home care    The goal of treatment is to help relieve the symptoms and get you feeling better. The rash will usually fade over several days. But it can sometimes last a couple of weeks. Over the next couple of days, there may be times when it is gets a little worse, and then better again. Here are some things to do:  · If you know what you are allergic to, stay away from it. Future reactions could be worse than this one.  · Avoid tight clothing and anything that heats up your skin (hot showers or baths, direct sunlight). Heat will make itching worse.  · An ice pack will relieve local areas of intense itching and redness. To make an ice pack, put ice  cubes in a plastic bag that seals at the top. Wrap it in a thin, clean towel. Dont put the ice directly on the skin because it can damage the skin.  · Oral diphenhydramine is an over-the-counter antihistamine sold at pharmacy and grocery stores. Unless a prescription antihistamine was given, diphenhydramine may be used to reduce itching if large areas of the skin are involved. It may make you sleepy. So be careful using it in the daytime or when going to school, working, or driving. Note: Dont use diphenhydramine if you have glaucoma or if you are a man with trouble urinating due to an enlarged prostate. There are other antihistamines that wont make you so sleepy. These are good choices for daytime use. Ask your pharmacist for suggestions.  · Dont use diphenhydramine cream on your skin. It can cause a further reaction in some people.  · To help prevent an infection, don't scratch the affected area. Scratching may worsen the reaction and damage your skin. It can also lead to an infection. Always check the affected for signs of an infection.  · Call your healthcare provider and ask what you can use to help decrease the itching.  · To decrease allergic reactions, try the following:    · Use heat-steam to clean your home  · Use high-efficiency particulate (HEPA) vacuums and filters  · Stay away from food and pet triggers  · Kill any cockroaches  · Clean your house often  Follow-up care  Follow up with your healthcare provider, or as advised. If you had a severe reaction today, or if you have had several mild to medium allergic reactions in the past, ask your provider about allergy testing. This can help you find out what you are allergic to. If your reaction included dizziness, fainting, or trouble breathing or swallowing, ask your provider about carrying auto-injectable epinephrine.  Call 911  Call 911 if any of these occur:  · Trouble breathing or swallowing, wheezing  · Cool, moist, pale skin  · Shortness of  breath  · Hoarse voice or trouble speaking  · Confused   · Very drowsy or trouble awakening  · Fainting or loss of consciousness  · Rapid heart rate  · Feeling of dizziness or weakness or a sudden drop in blood pressure  · Feeling of doom  · Feeling lightheaded  · Severe nausea or vomiting, or diarrhea  · Seizure  · Swelling in the face, eyelids, lips, mouth, throat or tongue  · Drooling  When to seek medical advice  Call your healthcare provider right away if any of these occur:  · Spreading areas of itching, redness or swelling  · Nausea or stomach cramps or abdominal pain  · Continuing or recurring symptoms  · Spreading areas of redness, swelling, or itching  · Signs of infection at the affected site:  ¨ Spreading redness  ¨ Increased pain or swelling  ¨ Fluid or colored drainage from the site  ¨ Fever of 100.4°F (38°C) or above lasting for 24 to 48 hours, or as directed by your provider  Date Last Reviewed: 3/1/2017  © 8975-5171 [employer] [address] [place] [address], [place] [address]. All rights reserved. This information is not intended as a substitute for professional medical care. Always follow your healthcare professional's instructions.
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
## 2019-06-17 NOTE — PROGRESS NOTES
Patient identified by name and date of birth. Denies any allergies. Injections administered as ordered by aseptic technique. Tolerated well by pt

## 2019-06-18 ENCOUNTER — HOSPITAL ENCOUNTER (EMERGENCY)
Facility: HOSPITAL | Age: 59
Discharge: HOME | End: 2019-06-18
Attending: EMERGENCY MEDICINE
Payer: COMMERCIAL

## 2019-06-18 VITALS
WEIGHT: 192 LBS | BODY MASS INDEX: 29.1 KG/M2 | TEMPERATURE: 98 F | SYSTOLIC BLOOD PRESSURE: 112 MMHG | RESPIRATION RATE: 18 BRPM | OXYGEN SATURATION: 98 % | HEIGHT: 68 IN | HEART RATE: 75 BPM | DIASTOLIC BLOOD PRESSURE: 73 MMHG

## 2019-06-18 DIAGNOSIS — R21 RASH: Primary | ICD-10-CM

## 2019-06-18 DIAGNOSIS — T78.40XA ALLERGIC REACTION, INITIAL ENCOUNTER: ICD-10-CM

## 2019-06-18 DIAGNOSIS — L50.9 URTICARIA: ICD-10-CM

## 2019-06-18 PROCEDURE — 25000003 PHARM REV CODE 250: Performed by: EMERGENCY MEDICINE

## 2019-06-18 PROCEDURE — 63600175 PHARM REV CODE 636 W HCPCS: Performed by: EMERGENCY MEDICINE

## 2019-06-18 PROCEDURE — 99284 EMERGENCY DEPT VISIT MOD MDM: CPT

## 2019-06-18 PROCEDURE — 99284 PR EMERGENCY DEPT VISIT,LEVEL IV: ICD-10-PCS | Mod: ,,, | Performed by: EMERGENCY MEDICINE

## 2019-06-18 PROCEDURE — 99284 EMERGENCY DEPT VISIT MOD MDM: CPT | Mod: ,,, | Performed by: EMERGENCY MEDICINE

## 2019-06-18 RX ORDER — FAMOTIDINE 20 MG/1
20 TABLET, FILM COATED ORAL
Status: COMPLETED | OUTPATIENT
Start: 2019-06-18 | End: 2019-06-18

## 2019-06-18 RX ORDER — PREDNISONE 50 MG/1
50 TABLET ORAL DAILY
Qty: 4 TABLET | Refills: 0 | Status: SHIPPED | OUTPATIENT
Start: 2019-06-18 | End: 2019-06-22

## 2019-06-18 RX ORDER — CLINDAMYCIN HYDROCHLORIDE 300 MG/1
300 CAPSULE ORAL EVERY 6 HOURS
COMMUNITY
End: 2022-02-11

## 2019-06-18 RX ORDER — FAMOTIDINE 20 MG/1
20 TABLET, FILM COATED ORAL DAILY
Qty: 4 TABLET | Refills: 0 | Status: SHIPPED | OUTPATIENT
Start: 2019-06-18 | End: 2022-02-11

## 2019-06-18 RX ORDER — PREDNISONE 20 MG/1
40 TABLET ORAL
Status: COMPLETED | OUTPATIENT
Start: 2019-06-18 | End: 2019-06-18

## 2019-06-18 RX ORDER — EPINEPHRINE 0.3 MG/.3ML
1 INJECTION SUBCUTANEOUS
Qty: 1 EACH | Refills: 0 | Status: SHIPPED | OUTPATIENT
Start: 2019-06-18 | End: 2020-06-17

## 2019-06-18 RX ADMIN — PREDNISONE 40 MG: 20 TABLET ORAL at 11:06

## 2019-06-18 RX ADMIN — FAMOTIDINE 20 MG: 20 TABLET, FILM COATED ORAL at 11:06

## 2019-06-18 NOTE — ED NOTES
LOC: The patient is awake, alert, and aware of environment. The patient is oriented x 3 and speaking appropriately.   APPEARANCE: No acute distress noted.   PSYCHOSOCIAL: Patient is calm and cooperative.   SKIN: The skin is warm, dry. Redness noted to the face, neck, and abdomen.   RESPIRATORY: Airway is open and patent. Bilateral chest rise and fall. Respirations are spontaneous, even and unlabored. Normal effort and rate noted. No accessory muscle use noted.   CARDIAC:Denies chest pain or SOB.   ABDOMEN: Soft and non tender to palpation. No distention noted.   URINARY:  Voids independently.   EXTREMITIES: Pt has redness rash noted to bilateral upper and lower extremities.   NEUROLOGIC: Eyes open spontaneously. Speech clear. Tolerating saliva secretions well. Able to follow commands, demonstrating ability to actively and appropriately communicate within context of current conversation. Symmetrical facial muscles. Moving all extremities well. Movement is purposeful.   MUSCULOSKELETAL: No obvious deformities noted.

## 2019-06-18 NOTE — ED PROVIDER NOTES
Encounter Date: 6/18/2019    SCRIBE #1 NOTE: I, Ruchi Rodriguez, am scribing for, and in the presence of,  Dr. Anthony. I have scribed the following portions of the note - Other sections scribed: HPI, ROS, PE.       History     Chief Complaint   Patient presents with    Rash     rash and itching , seen in clinic yest and given 2 shots, no better, on antibiotics for cellulitis     Time patient was seen by the provider: 10:50 AM      The patient is a 58 y.o. female with history of pheochromocytoma who presents to the ED with a complaint of rash. Patient reports rash to face, trunk, and extremities. She was seen in primary care clinic yesterday and given 2 injections (dexamethasone sodium phosphate 8 mg and diphenhydramine HCl 25 mg). She also was sent home with benadryl, but states no improvement. Patient recently finished bactrim and started clindamycin for a right lower leg cellulitis. She notes she peeled shrimp over the weekend and states her PCP notes her rash could be due to an allergic reaction to shrimp or bactrim. She has a follow up visit scheduled with her PCP for 06/20.    The history is provided by the patient and medical records.     Review of patient's allergies indicates:   Allergen Reactions    Shellfish containing products Rash     Likely shellfish allergy after cleaning and eating shrimp awoke with red itching rash whole body     Bactrim [sulfamethoxazole-trimethoprim] Rash     Past Medical History:   Diagnosis Date    Genital warts     H/O pheochromocytoma      Past Surgical History:   Procedure Laterality Date    ABDOMINAL ADHESION SURGERY      APPENDECTOMY N/A 2/1/2017    Performed by [facility] at [facility] 2ND FLR    CERVICAL CONIZATION   W/ LASER      COLONOSCOPY N/A 8/1/2016    Performed by MIRNA Hawk MD at [facility] ENDO (4TH FLR)    TUMOR REMOVAL  2004     Family History   Problem Relation Age of Onset    Diabetes Mother     Stroke Mother     Heart disease Father      Hypertension Father     Cancer Maternal Aunt     Breast cancer Cousin     Colon cancer Neg Hx     Ovarian cancer Neg Hx      Social History     Tobacco Use    Smoking status: Never Smoker    Smokeless tobacco: Never Used   Substance Use Topics    Alcohol use: No     Alcohol/week: 0.0 oz    Drug use: No     Review of Systems   Constitutional: Negative for chills and fever.   HENT: Negative for rhinorrhea and sore throat.    Respiratory: Negative for cough and shortness of breath.    Cardiovascular: Negative for chest pain.   Gastrointestinal: Negative for nausea.   Genitourinary: Negative for dysuria and vaginal discharge.   Musculoskeletal: Negative for back pain and myalgias.   Skin: Positive for rash (face, trunk, extremities x4).   Neurological: Negative for weakness and headaches.   Hematological: Does not bruise/bleed easily.       Physical Exam     Initial Vitals [06/18/19 1044]   BP Pulse Resp Temp SpO2   112/73 75 18 98.3 °F (36.8 °C) 98 %      MAP       --         Physical Exam    Nursing note and vitals reviewed.    Gen/Constitutional: Interactive. No acute distress  Head: Normocephalic, Atraumatic  Neck: supple, no masses or LAD  Eyes: PERRLA, conjunctiva clear  Ears, Nose and Throat: No rhinorrhea or stridor.  No oropharyngeal edema or swelling/erythema noted  Cardiac: Reg Rhythm, No murmur  Pulmonary: CTA Bilat, no wheezes, rhonchi, rales.  GI: Abdomen soft, non-tender, non-distended; no rebound or guarding  : No CVA tenderness.  Musculoskeletal: Extremities warm, well perfused, no erythema, no edema  Skin:  Multiple areas of urticaria, and vesicular rash over entire trunk extremities and face  Neuro: Alert and Oriented x 3; No focal motor or sensory deficits.    Psych: Normal affect     ED Course   Procedures  Labs Reviewed - No data to display       Imaging Results    None          Medical Decision Making:   History:   Old Medical Records: I decided to obtain old medical records.  Old  Records Summarized: records from clinic visits and other records.  Initial Assessment:   58 y.o. female presenting with rash.   Differential Diagnosis:   Differential diagnosis includes but is not limited to:  Urticaria, allergic reaction, anaphylaxis, shellfish allergy, medication reaction, antibiotic reaction, sepsis.    Emergent evaluation of patient presenting with urticaria, and a new generalized rash to the trunk and extremities including face.  She is afebrile, vital signs are stable. No airway involvement, no or pharyngeal edema, wheezing or stridor noted. Rash is itchy, noted in the setting of shellfish versus antibiotic.  Will treat with prednisone, and dual H2 antagonist with close follow-up with PCP.  Patient was given prednisone and PPI in the ED.  Instructions on EpiPen use, and a prescription for EpiPen given concern for shellfish allergy also discussed at length.  Patient will follow up with PCP, and schedule outpatient testing for allergies as needed.  No other red flags at this time to suggest anaphylaxis, airway involvement, as localized to Skin 1 organ system. Patient agreeable to discharge plan. Strict ED precautions and return instructions discussed at length and patient verbalized understanding. All questions were answered and ample time was given for questions.      Complexity:  Moderate high          Scribe Attestation:   Scribe #1: I performed the above scribed service and the documentation accurately describes the services I performed. I attest to the accuracy of the note.    I, Dr. Yves Anthony, personally performed the services described in this documentation. All medical record entries made by the scribe were at my direction and in my presence.  I have reviewed the chart and agree that the record reflects my personal performance and is accurate and complete.              Clinical Impression:       ICD-10-CM ICD-9-CM   1. Rash R21 782.1   2. Allergic reaction, initial encounter  T78.40XA 995.3   3. Urticaria L50.9 708.9         Disposition:   Disposition: Discharged  Condition: Stable         Yves Anthony DO  Dept of Emergency Medicine   [facility]  Spectralink: [id number]                Yves Anthony DO  06/18/19 2355

## 2019-06-18 NOTE — ED TRIAGE NOTES
Pt presents to ed complaining of a rash present on the face, abdomen, chest, and 4 extremities. Pt reports getting benadryl and steroid shot yesterday. Pt reports the rash is not improving.

## 2019-06-18 NOTE — DISCHARGE INSTRUCTIONS
Take medications As prescribed.  Complete all of your antibiotics.  Follow up with your primary care this week for re-evaluation.  If you have any trouble breathing, worsening symptoms or any concerns return to the emergency department.

## 2019-06-19 ENCOUNTER — PES CALL (OUTPATIENT)
Dept: ADMINISTRATIVE | Facility: CLINIC | Age: 59
End: 2019-06-19

## 2019-06-20 ENCOUNTER — ANCILLARY ORDERS (OUTPATIENT)
Dept: PRIMARY CARE CLINIC | Facility: CLINIC | Age: 59
End: 2019-06-20

## 2019-06-20 ENCOUNTER — OFFICE VISIT (OUTPATIENT)
Dept: PRIMARY CARE CLINIC | Facility: CLINIC | Age: 59
End: 2019-06-20
Attending: NURSE PRACTITIONER
Payer: COMMERCIAL

## 2019-06-20 VITALS
HEIGHT: 67 IN | SYSTOLIC BLOOD PRESSURE: 118 MMHG | HEART RATE: 66 BPM | RESPIRATION RATE: 18 BRPM | DIASTOLIC BLOOD PRESSURE: 66 MMHG | OXYGEN SATURATION: 97 % | WEIGHT: 194.25 LBS | BODY MASS INDEX: 30.49 KG/M2 | TEMPERATURE: 98 F

## 2019-06-20 DIAGNOSIS — R21 RASH DUE TO ALLERGY: ICD-10-CM

## 2019-06-20 DIAGNOSIS — L03.115 CELLULITIS OF RIGHT LOWER EXTREMITY: Primary | ICD-10-CM

## 2019-06-20 DIAGNOSIS — T78.40XA RASH DUE TO ALLERGY: ICD-10-CM

## 2019-06-20 DIAGNOSIS — E78.00 PURE HYPERCHOLESTEROLEMIA: ICD-10-CM

## 2019-06-20 DIAGNOSIS — Z13.220 SCREENING FOR LIPID DISORDERS: ICD-10-CM

## 2019-06-20 DIAGNOSIS — Z00.00 ANNUAL PHYSICAL EXAM: ICD-10-CM

## 2019-06-20 DIAGNOSIS — R92.8 ABNORMALITY OF BOTH BREASTS ON SCREENING MAMMOGRAM: Primary | ICD-10-CM

## 2019-06-20 DIAGNOSIS — E66.01 MORBID OBESITY: ICD-10-CM

## 2019-06-20 DIAGNOSIS — Z12.31 VISIT FOR SCREENING MAMMOGRAM: ICD-10-CM

## 2019-06-20 PROCEDURE — 99213 PR OFFICE/OUTPT VISIT, EST, LEVL III, 20-29 MIN: ICD-10-PCS | Mod: S$GLB,,, | Performed by: NURSE PRACTITIONER

## 2019-06-20 PROCEDURE — 3008F PR BODY MASS INDEX (BMI) DOCUMENTED: ICD-10-PCS | Mod: CPTII,S$GLB,, | Performed by: NURSE PRACTITIONER

## 2019-06-20 PROCEDURE — 99999 PR PBB SHADOW E&M-EST. PATIENT-LVL IV: CPT | Mod: PBBFAC,,, | Performed by: NURSE PRACTITIONER

## 2019-06-20 PROCEDURE — 99999 PR PBB SHADOW E&M-EST. PATIENT-LVL IV: ICD-10-PCS | Mod: PBBFAC,,, | Performed by: NURSE PRACTITIONER

## 2019-06-20 PROCEDURE — 3008F BODY MASS INDEX DOCD: CPT | Mod: CPTII,S$GLB,, | Performed by: NURSE PRACTITIONER

## 2019-06-20 PROCEDURE — 99213 OFFICE O/P EST LOW 20 MIN: CPT | Mod: S$GLB,,, | Performed by: NURSE PRACTITIONER
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
## 2020-04-29 ENCOUNTER — LAB VISIT (OUTPATIENT)
Dept: PRIMARY CARE CLINIC | Facility: CLINIC | Age: 60
End: 2020-04-29
Payer: COMMERCIAL

## 2020-04-29 DIAGNOSIS — R50.9 FEVER, UNSPECIFIED FEVER CAUSE: Primary | ICD-10-CM

## 2020-04-29 PROCEDURE — U0002 COVID-19 LAB TEST NON-CDC: HCPCS

## 2020-04-30 ENCOUNTER — TELEPHONE (OUTPATIENT)
Dept: INTERNAL MEDICINE | Facility: CLINIC | Age: 60
End: 2020-04-30

## 2020-04-30 ENCOUNTER — TELEPHONE (OUTPATIENT)
Dept: PRIMARY CARE CLINIC | Facility: CLINIC | Age: 60
End: 2020-04-30

## 2020-04-30 LAB — SARS-COV-2 RNA RESP QL NAA+PROBE: DETECTED

## 2020-04-30 NOTE — TELEPHONE ENCOUNTER
Spoke with pt states she tested positive for the coronavirus yesterday, her  has been in the ICU for a month being treated for the virus and he is being discharged home today. Voiced concerns about she and her  being around each other and wanted to know how long it takes to get rid of the virus. Patient adds she has only had GI issues with the virus no fever cough or sob. Patient instructed for herself and her  to wear a mask for at least the next two weeks  as it does take a while to shed the virus and that way they are protecting each other and their adult son who lives with them but is in and out during the day. Patient verbalizes understanding.

## 2020-04-30 NOTE — TELEPHONE ENCOUNTER
----- Message from Blaine Sheldon sent at 4/30/2020  9:06 AM CDT -----  Contact: Patient [phone]  Patient would like to get medical advice.    Comments: Patient calling stating still has Covid19 over 14 Days, wants to know why still in system, call to discuss further.    Please call an advise  Thank you

## 2020-04-30 NOTE — TELEPHONE ENCOUNTER
Spoke w pt-notified of + detected covid 19 result. She states  has covid 19& in hospital. She is advised to quarantine x 14 days, notify contacts that they have been exposed & the need for them to self quarantine x 14 days, F/U with her PCP today. Can obtain results from med records. Pt verbalizes understanding.

## 2020-05-14 ENCOUNTER — OFFICE VISIT (OUTPATIENT)
Dept: PRIMARY CARE CLINIC | Facility: CLINIC | Age: 60
End: 2020-05-14
Payer: COMMERCIAL

## 2020-05-14 VITALS
RESPIRATION RATE: 16 BRPM | HEART RATE: 78 BPM | BODY MASS INDEX: 28.14 KG/M2 | TEMPERATURE: 97 F | HEIGHT: 69 IN | OXYGEN SATURATION: 97 % | WEIGHT: 190 LBS | SYSTOLIC BLOOD PRESSURE: 102 MMHG | DIASTOLIC BLOOD PRESSURE: 60 MMHG

## 2020-05-14 DIAGNOSIS — E66.01 MORBID OBESITY: ICD-10-CM

## 2020-05-14 DIAGNOSIS — U07.1 COVID-19 VIRUS INFECTION: Primary | ICD-10-CM

## 2020-05-14 DIAGNOSIS — Z86.018 H/O PHEOCHROMOCYTOMA: ICD-10-CM

## 2020-05-14 DIAGNOSIS — E78.00 PURE HYPERCHOLESTEROLEMIA: ICD-10-CM

## 2020-05-14 DIAGNOSIS — Z13.220 ENCOUNTER FOR LIPID SCREENING FOR CARDIOVASCULAR DISEASE: ICD-10-CM

## 2020-05-14 DIAGNOSIS — Z13.6 ENCOUNTER FOR LIPID SCREENING FOR CARDIOVASCULAR DISEASE: ICD-10-CM

## 2020-05-14 PROCEDURE — 3008F PR BODY MASS INDEX (BMI) DOCUMENTED: ICD-10-PCS | Mod: CPTII,S$GLB,, | Performed by: INTERNAL MEDICINE

## 2020-05-14 PROCEDURE — 99999 PR PBB SHADOW E&M-EST. PATIENT-LVL III: CPT | Mod: PBBFAC,,, | Performed by: INTERNAL MEDICINE

## 2020-05-14 PROCEDURE — 99999 PR PBB SHADOW E&M-EST. PATIENT-LVL III: ICD-10-PCS | Mod: PBBFAC,,, | Performed by: INTERNAL MEDICINE

## 2020-05-14 PROCEDURE — 99214 PR OFFICE/OUTPT VISIT, EST, LEVL IV, 30-39 MIN: ICD-10-PCS | Mod: S$GLB,,, | Performed by: INTERNAL MEDICINE

## 2020-05-14 PROCEDURE — 99214 OFFICE O/P EST MOD 30 MIN: CPT | Mod: S$GLB,,, | Performed by: INTERNAL MEDICINE

## 2020-05-14 PROCEDURE — 3008F BODY MASS INDEX DOCD: CPT | Mod: CPTII,S$GLB,, | Performed by: INTERNAL MEDICINE

## 2020-05-14 RX ORDER — IBUPROFEN 100 MG/5ML
1000 SUSPENSION, ORAL (FINAL DOSE FORM) ORAL 2 TIMES DAILY
COMMUNITY

## 2020-05-14 NOTE — LETTER
[address], [facility] [[email or web address]] [address] ? [place], [address] ? Phone [phone] ? Fax [phone] ? [email or web address]          Return to Work/School    Patient: Sharifa Field  YOB: 1960   Date: 05/14/2020      To Whom It May Concern:     Sharifa Field was in contact with/seen in my office on 05/14/2020. COVID-19 is present in our communities across the state. There is limited testing for COVID at this time, so not all patients can be tested. In this situation, your employee meets the following criteria:     hSarifa Field has met the criteria for COVID-19 testing and has a POSITIVE result. The employee can return to work on 06/01/2020 with no restrictions      If you have any questions or concerns, or if I can be of further assistance, please do not hesitate to contact me.     Sincerely,          Kofi Vieira MD

## 2020-05-15 NOTE — PROGRESS NOTES
Subjective:       Patient ID: Sharifa Field is a 59 y.o. female.    Chief Complaint: Establish Care (New Patient, tested Positive for Covid-19 virus on 04/29. Pt.  recently d/c from [facility] x 2 weeks. Pt. needs return to work note)    HPI patient here for follow-up had COVID-19 infection start with the cold symptoms with coughing congestion fever and her  have severe COVID-19 infection has to be in the hospital on the ventilator the for couple weeks just recently get home he required patient maximum assistance with the adult daily living activities his still have a respiratory distress and congestive heart failure from the infection with cardiomyopathy patient clinically doing better she had a COVID test 14 days ago she currently asymptomatic but she is needed at home down to care for her  who is still trying to recover from the COVID-19 infection with respiratory failure was on ventilator patient deny any serious past medical history except the she had feel pheochromocytoma status post adrenal gland resection history of hyperlipidemia currently on medication and doing well she deny any physical symptom today no short of breath chest pain fever chill nausea vomiting diarrhea  Review of Systems    Objective:      Physical Exam   Constitutional: She is oriented to person, place, and time. She appears well-developed and well-nourished. No distress.   Overweight   HENT:   Head: Normocephalic and atraumatic.   Right Ear: External ear normal.   Left Ear: External ear normal.   Nose: Nose normal.   Mouth/Throat: Oropharynx is clear and moist. No oropharyngeal exudate.   Eyes: Pupils are equal, round, and reactive to light. Conjunctivae and EOM are normal. Right eye exhibits no discharge. Left eye exhibits no discharge.   Neck: Normal range of motion. Neck supple. No thyromegaly present.   Cardiovascular: Normal rate, regular rhythm, normal heart sounds and intact distal pulses. Exam  reveals no gallop and no friction rub.   No murmur heard.  Pulmonary/Chest: Effort normal and breath sounds normal. No respiratory distress. She has no wheezes. She has no rales. She exhibits no tenderness.   Abdominal: Soft. Bowel sounds are normal. She exhibits no distension. There is no tenderness. There is no rebound and no guarding.   Musculoskeletal: Normal range of motion. She exhibits no edema, tenderness or deformity.   Lymphadenopathy:     She has no cervical adenopathy.   Neurological: She is alert and oriented to person, place, and time.   Skin: Skin is warm and dry. Capillary refill takes less than 2 seconds. No rash noted. No erythema.   Psychiatric: She has a normal mood and affect. Judgment and thought content normal.   Nursing note and vitals reviewed.      Assessment:       1. COVID-19 virus infection    2. Morbid obesity    3. H/O pheochromocytoma    4. Pure hypercholesterolemia    5. Encounter for lipid screening for cardiovascular disease        Plan:       COVID-19 virus infection  Comments:  Patient is doing well recover well from the infection but currently at home care for her  who is very sick from respiratory failure and CHF EF25%    Morbid obesity  Comments:  Patient will try to lose weight with diet and exercise sin corona virus endemic not able to get out and do activity exercise    H/O pheochromocytoma  Comments:  Status post adrenal gland resection stable no sign of recurrent  Orders:  -     CBC auto differential; Future; Expected date: 05/14/2020  -     Comprehensive metabolic panel; Future; Expected date: 05/14/2020  -     POCT urine dipstick without microscope; Future; Expected date: 05/14/2020    Pure hypercholesterolemia  Comments:  Continue with diet and medication repeat blood tests in 3 months  Orders:  -     POCT urine dipstick without microscope; Future; Expected date: 05/14/2020    Encounter for lipid screening for cardiovascular disease  -     Lipid Panel; Future;  Expected date: 05/14/2020

## 2020-10-28 ENCOUNTER — OFFICE VISIT (OUTPATIENT)
Dept: URGENT CARE | Facility: CLINIC | Age: 60
End: 2020-10-28
Payer: COMMERCIAL

## 2020-10-28 VITALS
SYSTOLIC BLOOD PRESSURE: 126 MMHG | WEIGHT: 190 LBS | DIASTOLIC BLOOD PRESSURE: 83 MMHG | BODY MASS INDEX: 28.14 KG/M2 | HEART RATE: 69 BPM | RESPIRATION RATE: 17 BRPM | OXYGEN SATURATION: 97 % | TEMPERATURE: 98 F | HEIGHT: 69 IN

## 2020-10-28 DIAGNOSIS — R09.89 RUNNY NOSE: Primary | ICD-10-CM

## 2020-10-28 LAB
CTP QC/QA: YES
SARS-COV-2 RDRP RESP QL NAA+PROBE: NEGATIVE

## 2020-10-28 PROCEDURE — 99213 OFFICE O/P EST LOW 20 MIN: CPT | Mod: S$GLB,,, | Performed by: NURSE PRACTITIONER

## 2020-10-28 PROCEDURE — U0002: ICD-10-PCS | Mod: QW,S$GLB,, | Performed by: NURSE PRACTITIONER

## 2020-10-28 PROCEDURE — 99213 PR OFFICE/OUTPT VISIT, EST, LEVL III, 20-29 MIN: ICD-10-PCS | Mod: S$GLB,,, | Performed by: NURSE PRACTITIONER

## 2020-10-28 PROCEDURE — U0002 COVID-19 LAB TEST NON-CDC: HCPCS | Mod: QW,S$GLB,, | Performed by: NURSE PRACTITIONER

## 2020-10-28 NOTE — LETTER
[address] [facility] [address]  Phone: [phone]  Fax: [phone]          Return to Work/School    Patient: Sharifa Field  YOB: 1960   Date: 10/28/2020     To Whom It May Concern:     Sharifa Field was in contact with/seen in my office on 10/28/2020. COVID-19 is present in our communities across the state. There is limited testing for COVID at this time, so not all patients can be tested. In this situation, your employee meets the following criteria:     Sharifa Field has met the criteria for COVID-19 testing and has a NEGATIVE result. The employee can return to work once they are asymptomatic for 24 hours without the use of fever reducing medications (Tylenol, Motrin, etc).     If you have any questions or concerns, or if I can be of further assistance, please do not hesitate to contact me.     Sincerely,    Krystal Phipps NP

## 2020-10-28 NOTE — PROGRESS NOTES
"Subjective:       Patient ID: Sharifa Field is a 60 y.o. female.    Vitals:  height is 5' 9" (1.753 m) and weight is 86.2 kg (190 lb). Her temperature is 97.7 °F (36.5 °C). Her blood pressure is 126/83 and her pulse is 69. Her respiration is 17 and oxygen saturation is 97%.     Chief Complaint: Sinus Problem and Cough    Pt states sinus congestion with cough and fatigue.   Pt tested positive for covid 5/20.    Sinus Problem  This is a new problem. The current episode started in the past 7 days. The problem has been gradually improving since onset. There has been no fever. Associated symptoms include congestion, coughing and sinus pressure. Pertinent negatives include no chills, headaches, shortness of breath or sore throat. Past treatments include nothing.   Cough  Pertinent negatives include no chest pain, chills, fever, headaches, myalgias, rash, sore throat or shortness of breath.       Constitution: Positive for fatigue. Negative for chills and fever.   HENT: Positive for congestion and sinus pressure. Negative for sore throat.    Neck: Negative for painful lymph nodes.   Cardiovascular: Negative for chest pain and leg swelling.   Eyes: Negative for double vision and blurred vision.   Respiratory: Positive for cough. Negative for shortness of breath.    Gastrointestinal: Negative for nausea, vomiting and diarrhea.   Genitourinary: Negative for dysuria, frequency, urgency and history of kidney stones.   Musculoskeletal: Negative for joint pain, joint swelling, muscle cramps and muscle ache.   Skin: Negative for color change, pale, rash and bruising.   Allergic/Immunologic: Negative for seasonal allergies.   Neurological: Negative for dizziness, history of vertigo, light-headedness, passing out and headaches.   Hematologic/Lymphatic: Negative for swollen lymph nodes.   Psychiatric/Behavioral: Negative for nervous/anxious, sleep disturbance and depression. The patient is not nervous/anxious.      "   Objective:      Physical Exam   Constitutional: She is oriented to person, place, and time.   HENT:   Head: Normocephalic and atraumatic.   Cardiovascular: Normal rate.   Pulmonary/Chest: Effort normal. No respiratory distress.   Abdominal: Normal appearance.   Neurological: She is alert and oriented to person, place, and time.   Skin: Skin is warm and dry. Psychiatric: Her behavior is normal. Mood normal.         Results for orders placed or performed in visit on 10/28/20   POCT COVID-19 Rapid Screening   Result Value Ref Range    POC Rapid COVID Negative Negative     Acceptable Yes      Assessment:       1. Runny nose        Plan:         Your test was NEGATIVE for COVID-19 (coronavirus).      You may leave home and/or return to work when the following conditions are met:   24 hours fever free without fever-reducing medications AND   Improved symptoms    Additional instructions:  · Social distance per your local guidelines  · Call ahead before visiting your doctor.  · Wear a facemask when around others who do not live in your household.  · Cover your coughs and sneezes.  · Wash your hands often with soap and water; hand  can be used, too.      If your symptoms worsen or if you have any other concerns, please contact [facility] at [phone].     Sincerely,    Krystal Phipps NP  Runny nose  -     POCT COVID-19 Rapid Screening

## 2020-12-29 ENCOUNTER — CLINICAL SUPPORT (OUTPATIENT)
Dept: URGENT CARE | Facility: CLINIC | Age: 60
End: 2020-12-29
Payer: COMMERCIAL

## 2020-12-29 DIAGNOSIS — Z13.9 ENCOUNTER FOR SCREENING: Primary | ICD-10-CM

## 2020-12-29 LAB
CTP QC/QA: YES
SARS-COV-2 RDRP RESP QL NAA+PROBE: NEGATIVE

## 2020-12-29 PROCEDURE — U0002: ICD-10-PCS | Mod: QW,S$GLB,, | Performed by: FAMILY MEDICINE

## 2020-12-29 PROCEDURE — U0002 COVID-19 LAB TEST NON-CDC: HCPCS | Mod: QW,S$GLB,, | Performed by: FAMILY MEDICINE

## 2020-12-29 PROCEDURE — 99211 OFF/OP EST MAY X REQ PHY/QHP: CPT | Mod: S$GLB,,, | Performed by: FAMILY MEDICINE

## 2020-12-29 PROCEDURE — 99211 PR OFFICE/OUTPT VISIT, EST, LEVL I: ICD-10-PCS | Mod: S$GLB,,, | Performed by: FAMILY MEDICINE

## 2021-01-22 ENCOUNTER — CLINICAL SUPPORT (OUTPATIENT)
Dept: OTHER | Facility: CLINIC | Age: 61
End: 2021-01-22
Payer: COMMERCIAL

## 2021-01-22 DIAGNOSIS — Z00.8 ENCOUNTER FOR OTHER GENERAL EXAMINATION: ICD-10-CM

## 2021-01-23 VITALS — BODY MASS INDEX: 27.26 KG/M2 | HEIGHT: 70 IN

## 2021-01-23 LAB
HDLC SERPL-MCNC: 49 MG/DL
POC CHOLESTEROL, LDL (DOCK): 133 MG/DL
POC CHOLESTEROL, TOTAL: 196 MG/DL
POC GLUCOSE, FASTING: 85 MG/DL (ref 60–110)
TRIGL SERPL-MCNC: 71 MG/DL

## 2021-04-16 ENCOUNTER — PATIENT MESSAGE (OUTPATIENT)
Dept: RESEARCH | Facility: HOSPITAL | Age: 61
End: 2021-04-16

## 2021-07-07 ENCOUNTER — OFFICE VISIT (OUTPATIENT)
Dept: URGENT CARE | Facility: CLINIC | Age: 61
End: 2021-07-07
Payer: COMMERCIAL

## 2021-07-07 VITALS
OXYGEN SATURATION: 97 % | TEMPERATURE: 99 F | SYSTOLIC BLOOD PRESSURE: 138 MMHG | HEART RATE: 71 BPM | WEIGHT: 195 LBS | DIASTOLIC BLOOD PRESSURE: 90 MMHG | RESPIRATION RATE: 17 BRPM | BODY MASS INDEX: 27.92 KG/M2 | HEIGHT: 70 IN

## 2021-07-07 DIAGNOSIS — J06.9 VIRAL URI: Primary | ICD-10-CM

## 2021-07-07 DIAGNOSIS — R09.81 SINUS CONGESTION: ICD-10-CM

## 2021-07-07 LAB
CTP QC/QA: YES
SARS-COV-2 RDRP RESP QL NAA+PROBE: NEGATIVE

## 2021-07-07 PROCEDURE — 3008F PR BODY MASS INDEX (BMI) DOCUMENTED: ICD-10-PCS | Mod: CPTII,S$GLB,, | Performed by: PHYSICIAN ASSISTANT

## 2021-07-07 PROCEDURE — U0002: ICD-10-PCS | Mod: QW,S$GLB,, | Performed by: PHYSICIAN ASSISTANT

## 2021-07-07 PROCEDURE — U0002 COVID-19 LAB TEST NON-CDC: HCPCS | Mod: QW,S$GLB,, | Performed by: PHYSICIAN ASSISTANT

## 2021-07-07 PROCEDURE — 96372 THER/PROPH/DIAG INJ SC/IM: CPT | Mod: S$GLB,,, | Performed by: PHYSICIAN ASSISTANT

## 2021-07-07 PROCEDURE — 99214 PR OFFICE/OUTPT VISIT, EST, LEVL IV, 30-39 MIN: ICD-10-PCS | Mod: 25,S$GLB,CS, | Performed by: PHYSICIAN ASSISTANT

## 2021-07-07 PROCEDURE — 96372 PR INJECTION,THERAP/PROPH/DIAG2ST, IM OR SUBCUT: ICD-10-PCS | Mod: S$GLB,,, | Performed by: PHYSICIAN ASSISTANT

## 2021-07-07 PROCEDURE — 99214 OFFICE O/P EST MOD 30 MIN: CPT | Mod: 25,S$GLB,CS, | Performed by: PHYSICIAN ASSISTANT

## 2021-07-07 PROCEDURE — 3008F BODY MASS INDEX DOCD: CPT | Mod: CPTII,S$GLB,, | Performed by: PHYSICIAN ASSISTANT

## 2021-07-07 RX ORDER — DEXAMETHASONE SODIUM PHOSPHATE 100 MG/10ML
10 INJECTION INTRAMUSCULAR; INTRAVENOUS
Status: COMPLETED | OUTPATIENT
Start: 2021-07-07 | End: 2021-07-07

## 2021-07-07 RX ADMIN — DEXAMETHASONE SODIUM PHOSPHATE 10 MG: 100 INJECTION INTRAMUSCULAR; INTRAVENOUS at 11:07

## 2021-09-24 ENCOUNTER — CLINICAL SUPPORT (OUTPATIENT)
Dept: URGENT CARE | Facility: CLINIC | Age: 61
End: 2021-09-24
Payer: COMMERCIAL

## 2021-09-24 DIAGNOSIS — Z11.59 ENCOUNTER FOR SCREENING FOR OTHER VIRAL DISEASES: Primary | ICD-10-CM

## 2021-09-24 LAB
CTP QC/QA: YES
SARS-COV-2 RDRP RESP QL NAA+PROBE: NEGATIVE

## 2021-09-24 PROCEDURE — U0002 COVID-19 LAB TEST NON-CDC: HCPCS | Mod: QW,S$GLB,, | Performed by: FAMILY MEDICINE

## 2021-09-24 PROCEDURE — 99211 OFF/OP EST MAY X REQ PHY/QHP: CPT | Mod: S$GLB,,, | Performed by: FAMILY MEDICINE

## 2021-09-24 PROCEDURE — 99211 PR OFFICE/OUTPT VISIT, EST, LEVL I: ICD-10-PCS | Mod: S$GLB,,, | Performed by: FAMILY MEDICINE

## 2021-09-24 PROCEDURE — U0002: ICD-10-PCS | Mod: QW,S$GLB,, | Performed by: FAMILY MEDICINE

## 2021-10-09 ENCOUNTER — HOSPITAL ENCOUNTER (EMERGENCY)
Facility: HOSPITAL | Age: 61
Discharge: HOME | End: 2021-10-09
Attending: EMERGENCY MEDICINE
Payer: COMMERCIAL

## 2021-10-09 VITALS
WEIGHT: 190 LBS | TEMPERATURE: 98 F | DIASTOLIC BLOOD PRESSURE: 75 MMHG | HEART RATE: 73 BPM | RESPIRATION RATE: 16 BRPM | OXYGEN SATURATION: 99 % | HEIGHT: 69 IN | SYSTOLIC BLOOD PRESSURE: 125 MMHG | BODY MASS INDEX: 28.14 KG/M2

## 2021-10-09 DIAGNOSIS — K52.9 ENTERITIS: Primary | ICD-10-CM

## 2021-10-09 DIAGNOSIS — K80.20 CALCULUS OF GALLBLADDER WITHOUT CHOLECYSTITIS WITHOUT OBSTRUCTION: ICD-10-CM

## 2021-10-09 LAB
ALBUMIN SERPL BCP-MCNC: 4 G/DL (ref 3.5–5.2)
ALP SERPL-CCNC: 113 U/L (ref 55–135)
ALT SERPL W/O P-5'-P-CCNC: 14 U/L (ref 10–44)
ANION GAP SERPL CALC-SCNC: 17 MMOL/L (ref 8–16)
AST SERPL-CCNC: 18 U/L (ref 10–40)
BASOPHILS # BLD AUTO: 0.03 K/UL (ref 0–0.2)
BASOPHILS NFR BLD: 0.4 % (ref 0–1.9)
BILIRUB SERPL-MCNC: 1.5 MG/DL (ref 0.1–1)
BUN SERPL-MCNC: 14 MG/DL (ref 8–23)
BUN SERPL-MCNC: 20 MG/DL (ref 6–30)
CALCIUM SERPL-MCNC: 9.5 MG/DL (ref 8.7–10.5)
CHLORIDE SERPL-SCNC: 105 MMOL/L (ref 95–110)
CHLORIDE SERPL-SCNC: 107 MMOL/L (ref 95–110)
CO2 SERPL-SCNC: 20 MMOL/L (ref 23–29)
CREAT SERPL-MCNC: 0.8 MG/DL (ref 0.5–1.4)
CREAT SERPL-MCNC: 1.1 MG/DL (ref 0.5–1.4)
CTP QC/QA: YES
DIFFERENTIAL METHOD: NORMAL
EOSINOPHIL # BLD AUTO: 0.1 K/UL (ref 0–0.5)
EOSINOPHIL NFR BLD: 1.4 % (ref 0–8)
ERYTHROCYTE [DISTWIDTH] IN BLOOD BY AUTOMATED COUNT: 12.9 % (ref 11.5–14.5)
EST. GFR  (AFRICAN AMERICAN): >60 ML/MIN/1.73 M^2
EST. GFR  (NON AFRICAN AMERICAN): >60 ML/MIN/1.73 M^2
GLUCOSE SERPL-MCNC: 72 MG/DL (ref 70–110)
GLUCOSE SERPL-MCNC: 81 MG/DL (ref 70–110)
HCT VFR BLD AUTO: 41.7 % (ref 37–48.5)
HCT VFR BLD CALC: 43 %PCV (ref 36–54)
HGB BLD-MCNC: 14.5 G/DL (ref 12–16)
IMM GRANULOCYTES # BLD AUTO: 0.02 K/UL (ref 0–0.04)
IMM GRANULOCYTES NFR BLD AUTO: 0.3 % (ref 0–0.5)
LIPASE SERPL-CCNC: 14 U/L (ref 4–60)
LYMPHOCYTES # BLD AUTO: 2.7 K/UL (ref 1–4.8)
LYMPHOCYTES NFR BLD: 37.5 % (ref 18–48)
MAGNESIUM SERPL-MCNC: 2.3 MG/DL (ref 1.6–2.6)
MCH RBC QN AUTO: 30.9 PG (ref 27–31)
MCHC RBC AUTO-ENTMCNC: 34.8 G/DL (ref 32–36)
MCV RBC AUTO: 89 FL (ref 82–98)
MONOCYTES # BLD AUTO: 0.7 K/UL (ref 0.3–1)
MONOCYTES NFR BLD: 9.7 % (ref 4–15)
NEUTROPHILS # BLD AUTO: 3.7 K/UL (ref 1.8–7.7)
NEUTROPHILS NFR BLD: 50.7 % (ref 38–73)
NRBC BLD-RTO: 0 /100 WBC
PHOSPHATE SERPL-MCNC: 3.9 MG/DL (ref 2.7–4.5)
PLATELET # BLD AUTO: 257 K/UL (ref 150–450)
PMV BLD AUTO: 9.2 FL (ref 9.2–12.9)
POC IONIZED CALCIUM: 1.01 MMOL/L (ref 1.06–1.42)
POC TCO2 (MEASURED): 26 MMOL/L (ref 23–29)
POTASSIUM BLD-SCNC: 5.4 MMOL/L (ref 3.5–5.1)
POTASSIUM SERPL-SCNC: 4.1 MMOL/L (ref 3.5–5.1)
PROT SERPL-MCNC: 7.5 G/DL (ref 6–8.4)
RBC # BLD AUTO: 4.7 M/UL (ref 4–5.4)
SAMPLE: ABNORMAL
SARS-COV-2 RDRP RESP QL NAA+PROBE: NEGATIVE
SODIUM BLD-SCNC: 141 MMOL/L (ref 136–145)
SODIUM SERPL-SCNC: 142 MMOL/L (ref 136–145)
WBC # BLD AUTO: 7.25 K/UL (ref 3.9–12.7)

## 2021-10-09 PROCEDURE — 83690 ASSAY OF LIPASE: CPT | Performed by: EMERGENCY MEDICINE

## 2021-10-09 PROCEDURE — 99284 PR EMERGENCY DEPT VISIT,LEVEL IV: ICD-10-PCS | Mod: CS,,, | Performed by: PHYSICIAN ASSISTANT

## 2021-10-09 PROCEDURE — U0002 COVID-19 LAB TEST NON-CDC: HCPCS | Performed by: PHYSICIAN ASSISTANT

## 2021-10-09 PROCEDURE — 99285 EMERGENCY DEPT VISIT HI MDM: CPT | Mod: 25

## 2021-10-09 PROCEDURE — 80053 COMPREHEN METABOLIC PANEL: CPT | Performed by: EMERGENCY MEDICINE

## 2021-10-09 PROCEDURE — 84100 ASSAY OF PHOSPHORUS: CPT | Performed by: EMERGENCY MEDICINE

## 2021-10-09 PROCEDURE — 25500020 PHARM REV CODE 255: Performed by: EMERGENCY MEDICINE

## 2021-10-09 PROCEDURE — 83735 ASSAY OF MAGNESIUM: CPT | Performed by: EMERGENCY MEDICINE

## 2021-10-09 PROCEDURE — 99284 EMERGENCY DEPT VISIT MOD MDM: CPT | Mod: CS,,, | Performed by: PHYSICIAN ASSISTANT

## 2021-10-09 PROCEDURE — 85025 COMPLETE CBC W/AUTO DIFF WBC: CPT | Performed by: PHYSICIAN ASSISTANT

## 2021-10-09 RX ORDER — ONDANSETRON 4 MG/1
8 TABLET, ORALLY DISINTEGRATING ORAL EVERY 6 HOURS PRN
Qty: 12 TABLET | Refills: 0 | Status: SHIPPED | OUTPATIENT
Start: 2021-10-09 | End: 2022-02-11

## 2021-10-09 RX ADMIN — IOHEXOL 100 ML: 350 INJECTION, SOLUTION INTRAVENOUS at 03:10

## 2021-10-27 ENCOUNTER — HOSPITAL ENCOUNTER (OUTPATIENT)
Dept: RADIOLOGY | Facility: HOSPITAL | Age: 61
Discharge: HOME | End: 2021-10-27
Attending: OBSTETRICS & GYNECOLOGY
Payer: COMMERCIAL

## 2021-10-27 ENCOUNTER — OFFICE VISIT (OUTPATIENT)
Dept: OBSTETRICS AND GYNECOLOGY | Facility: CLINIC | Age: 61
End: 2021-10-27
Attending: OBSTETRICS & GYNECOLOGY
Payer: COMMERCIAL

## 2021-10-27 VITALS
SYSTOLIC BLOOD PRESSURE: 126 MMHG | HEIGHT: 69 IN | BODY MASS INDEX: 27.45 KG/M2 | WEIGHT: 185.31 LBS | DIASTOLIC BLOOD PRESSURE: 80 MMHG

## 2021-10-27 DIAGNOSIS — Z12.31 VISIT FOR SCREENING MAMMOGRAM: Primary | ICD-10-CM

## 2021-10-27 DIAGNOSIS — Z01.419 ENCOUNTER FOR GYNECOLOGICAL EXAMINATION (GENERAL) (ROUTINE) WITHOUT ABNORMAL FINDINGS: ICD-10-CM

## 2021-10-27 DIAGNOSIS — Z12.31 VISIT FOR SCREENING MAMMOGRAM: ICD-10-CM

## 2021-10-27 PROCEDURE — 77063 MAMMO DIGITAL SCREENING BILAT WITH TOMO: ICD-10-PCS | Mod: 26,,, | Performed by: RADIOLOGY

## 2021-10-27 PROCEDURE — 77067 SCR MAMMO BI INCL CAD: CPT | Mod: 26,,, | Performed by: RADIOLOGY

## 2021-10-27 PROCEDURE — 77067 MAMMO DIGITAL SCREENING BILAT WITH TOMO: ICD-10-PCS | Mod: 26,,, | Performed by: RADIOLOGY

## 2021-10-27 PROCEDURE — 77063 BREAST TOMOSYNTHESIS BI: CPT | Mod: 26,,, | Performed by: RADIOLOGY

## 2021-10-27 PROCEDURE — 99999 PR PBB SHADOW E&M-EST. PATIENT-LVL III: CPT | Mod: PBBFAC,,, | Performed by: OBSTETRICS & GYNECOLOGY

## 2021-10-27 PROCEDURE — 99999 PR PBB SHADOW E&M-EST. PATIENT-LVL III: ICD-10-PCS | Mod: PBBFAC,,, | Performed by: OBSTETRICS & GYNECOLOGY

## 2021-10-27 PROCEDURE — 77067 SCR MAMMO BI INCL CAD: CPT | Mod: TC

## 2021-11-03 ENCOUNTER — OFFICE VISIT (OUTPATIENT)
Dept: PRIMARY CARE CLINIC | Facility: CLINIC | Age: 61
End: 2021-11-03
Payer: COMMERCIAL

## 2021-11-03 VITALS
HEART RATE: 71 BPM | SYSTOLIC BLOOD PRESSURE: 114 MMHG | OXYGEN SATURATION: 99 % | HEIGHT: 69 IN | BODY MASS INDEX: 27.75 KG/M2 | WEIGHT: 187.38 LBS | DIASTOLIC BLOOD PRESSURE: 60 MMHG | RESPIRATION RATE: 16 BRPM

## 2021-11-03 DIAGNOSIS — E78.00 PURE HYPERCHOLESTEROLEMIA: ICD-10-CM

## 2021-11-03 DIAGNOSIS — K80.20 GALL STONES: ICD-10-CM

## 2021-11-03 DIAGNOSIS — K80.20 CALCULUS OF GALLBLADDER WITHOUT CHOLECYSTITIS WITHOUT OBSTRUCTION: Primary | ICD-10-CM

## 2021-11-03 DIAGNOSIS — Z23 NEED FOR VACCINATION: ICD-10-CM

## 2021-11-03 DIAGNOSIS — Z01.419 ROUTINE GYNECOLOGICAL EXAMINATION: ICD-10-CM

## 2021-11-03 PROCEDURE — 3074F PR MOST RECENT SYSTOLIC BLOOD PRESSURE < 130 MM HG: ICD-10-PCS | Mod: CPTII,S$GLB,, | Performed by: INTERNAL MEDICINE

## 2021-11-03 PROCEDURE — 99999 PR PBB SHADOW E&M-EST. PATIENT-LVL IV: ICD-10-PCS | Mod: PBBFAC,,, | Performed by: INTERNAL MEDICINE

## 2021-11-03 PROCEDURE — 3008F BODY MASS INDEX DOCD: CPT | Mod: CPTII,S$GLB,, | Performed by: INTERNAL MEDICINE

## 2021-11-03 PROCEDURE — 99214 PR OFFICE/OUTPT VISIT, EST, LEVL IV, 30-39 MIN: ICD-10-PCS | Mod: S$GLB,,, | Performed by: INTERNAL MEDICINE

## 2021-11-03 PROCEDURE — 3078F DIAST BP <80 MM HG: CPT | Mod: CPTII,S$GLB,, | Performed by: INTERNAL MEDICINE

## 2021-11-03 PROCEDURE — 3074F SYST BP LT 130 MM HG: CPT | Mod: CPTII,S$GLB,, | Performed by: INTERNAL MEDICINE

## 2021-11-03 PROCEDURE — 3078F PR MOST RECENT DIASTOLIC BLOOD PRESSURE < 80 MM HG: ICD-10-PCS | Mod: CPTII,S$GLB,, | Performed by: INTERNAL MEDICINE

## 2021-11-03 PROCEDURE — 99214 OFFICE O/P EST MOD 30 MIN: CPT | Mod: S$GLB,,, | Performed by: INTERNAL MEDICINE

## 2021-11-03 PROCEDURE — 3008F PR BODY MASS INDEX (BMI) DOCUMENTED: ICD-10-PCS | Mod: CPTII,S$GLB,, | Performed by: INTERNAL MEDICINE

## 2021-11-03 PROCEDURE — 99999 PR PBB SHADOW E&M-EST. PATIENT-LVL IV: CPT | Mod: PBBFAC,,, | Performed by: INTERNAL MEDICINE

## 2021-11-03 RX ORDER — ATORVASTATIN CALCIUM 40 MG/1
40 TABLET, FILM COATED ORAL DAILY
Qty: 90 TABLET | Refills: 3 | Status: SHIPPED | OUTPATIENT
Start: 2021-11-03 | End: 2022-11-03

## 2021-11-09 ENCOUNTER — OFFICE VISIT (OUTPATIENT)
Dept: SURGERY | Facility: CLINIC | Age: 61
End: 2021-11-09
Payer: COMMERCIAL

## 2021-11-09 VITALS
DIASTOLIC BLOOD PRESSURE: 81 MMHG | SYSTOLIC BLOOD PRESSURE: 118 MMHG | OXYGEN SATURATION: 97 % | HEIGHT: 69 IN | HEART RATE: 72 BPM | RESPIRATION RATE: 18 BRPM | WEIGHT: 180.75 LBS | BODY MASS INDEX: 26.77 KG/M2

## 2021-11-09 DIAGNOSIS — K80.20 CALCULUS OF GALLBLADDER WITHOUT CHOLECYSTITIS WITHOUT OBSTRUCTION: ICD-10-CM

## 2021-11-09 DIAGNOSIS — K80.20 GALL STONES: ICD-10-CM

## 2021-11-09 DIAGNOSIS — Z01.818 PRE-OP TESTING: ICD-10-CM

## 2021-11-09 PROCEDURE — 99205 OFFICE O/P NEW HI 60 MIN: CPT | Mod: S$GLB,,, | Performed by: SURGERY

## 2021-11-09 PROCEDURE — 99999 PR PBB SHADOW E&M-EST. PATIENT-LVL V: CPT | Mod: PBBFAC,,, | Performed by: SURGERY

## 2021-11-09 PROCEDURE — 1159F PR MEDICATION LIST DOCUMENTED IN MEDICAL RECORD: ICD-10-PCS | Mod: CPTII,S$GLB,, | Performed by: SURGERY

## 2021-11-09 PROCEDURE — 99999 PR PBB SHADOW E&M-EST. PATIENT-LVL V: ICD-10-PCS | Mod: PBBFAC,,, | Performed by: SURGERY

## 2021-11-09 PROCEDURE — 99205 PR OFFICE/OUTPT VISIT, NEW, LEVL V, 60-74 MIN: ICD-10-PCS | Mod: S$GLB,,, | Performed by: SURGERY

## 2021-11-09 PROCEDURE — 3074F SYST BP LT 130 MM HG: CPT | Mod: CPTII,S$GLB,, | Performed by: SURGERY

## 2021-11-09 PROCEDURE — 3079F PR MOST RECENT DIASTOLIC BLOOD PRESSURE 80-89 MM HG: ICD-10-PCS | Mod: CPTII,S$GLB,, | Performed by: SURGERY

## 2021-11-09 PROCEDURE — 1159F MED LIST DOCD IN RCRD: CPT | Mod: CPTII,S$GLB,, | Performed by: SURGERY

## 2021-11-09 PROCEDURE — 3079F DIAST BP 80-89 MM HG: CPT | Mod: CPTII,S$GLB,, | Performed by: SURGERY

## 2021-11-09 PROCEDURE — 3008F BODY MASS INDEX DOCD: CPT | Mod: CPTII,S$GLB,, | Performed by: SURGERY

## 2021-11-09 PROCEDURE — 3074F PR MOST RECENT SYSTOLIC BLOOD PRESSURE < 130 MM HG: ICD-10-PCS | Mod: CPTII,S$GLB,, | Performed by: SURGERY

## 2021-11-09 PROCEDURE — 1160F RVW MEDS BY RX/DR IN RCRD: CPT | Mod: CPTII,S$GLB,, | Performed by: SURGERY

## 2021-11-09 PROCEDURE — 1160F PR REVIEW ALL MEDS BY PRESCRIBER/CLIN PHARMACIST DOCUMENTED: ICD-10-PCS | Mod: CPTII,S$GLB,, | Performed by: SURGERY

## 2021-11-09 PROCEDURE — 3008F PR BODY MASS INDEX (BMI) DOCUMENTED: ICD-10-PCS | Mod: CPTII,S$GLB,, | Performed by: SURGERY

## 2021-11-09 RX ORDER — CEFAZOLIN SODIUM 2 G/50ML
2 SOLUTION INTRAVENOUS
Status: CANCELLED | OUTPATIENT
Start: 2021-11-09

## 2021-11-09 RX ORDER — ENOXAPARIN SODIUM 100 MG/ML
40 INJECTION SUBCUTANEOUS
Status: CANCELLED | OUTPATIENT
Start: 2021-12-29

## 2021-11-09 RX ORDER — SODIUM CHLORIDE 9 MG/ML
INJECTION, SOLUTION INTRAVENOUS CONTINUOUS
Status: CANCELLED | OUTPATIENT
Start: 2021-11-09

## 2021-12-07 ENCOUNTER — TELEPHONE (OUTPATIENT)
Dept: SURGERY | Facility: CLINIC | Age: 61
End: 2021-12-07
Payer: COMMERCIAL

## 2022-01-20 ENCOUNTER — CLINICAL SUPPORT (OUTPATIENT)
Dept: OTHER | Facility: CLINIC | Age: 62
End: 2022-01-20
Payer: COMMERCIAL

## 2022-01-20 DIAGNOSIS — Z00.8 ENCOUNTER FOR OTHER GENERAL EXAMINATION: ICD-10-CM

## 2022-01-21 VITALS — HEIGHT: 69 IN | BODY MASS INDEX: 26.7 KG/M2

## 2022-01-21 LAB
GLUCOSE SERPL-MCNC: 76 MG/DL (ref 60–140)
HDLC SERPL-MCNC: 40 MG/DL
POC CHOLESTEROL, LDL (DOCK): 92 MG/DL
POC CHOLESTEROL, TOTAL: 162 MG/DL
TRIGL SERPL-MCNC: 150 MG/DL

## 2022-02-11 ENCOUNTER — OFFICE VISIT (OUTPATIENT)
Dept: PRIMARY CARE CLINIC | Facility: CLINIC | Age: 62
End: 2022-02-11
Payer: COMMERCIAL

## 2022-02-11 VITALS
RESPIRATION RATE: 18 BRPM | BODY MASS INDEX: 27.17 KG/M2 | WEIGHT: 183.44 LBS | HEART RATE: 73 BPM | SYSTOLIC BLOOD PRESSURE: 130 MMHG | DIASTOLIC BLOOD PRESSURE: 76 MMHG | HEIGHT: 69 IN | OXYGEN SATURATION: 96 % | TEMPERATURE: 98 F

## 2022-02-11 DIAGNOSIS — J06.9 URI WITH COUGH AND CONGESTION: Primary | ICD-10-CM

## 2022-02-11 LAB
CTP QC/QA: YES
SARS-COV-2 RDRP RESP QL NAA+PROBE: NEGATIVE

## 2022-02-11 PROCEDURE — 99999 PR PBB SHADOW E&M-EST. PATIENT-LVL IV: CPT | Mod: PBBFAC,,, | Performed by: FAMILY MEDICINE

## 2022-02-11 PROCEDURE — 1160F RVW MEDS BY RX/DR IN RCRD: CPT | Mod: CPTII,S$GLB,, | Performed by: FAMILY MEDICINE

## 2022-02-11 PROCEDURE — U0002 COVID-19 LAB TEST NON-CDC: HCPCS | Mod: QW,S$GLB,, | Performed by: FAMILY MEDICINE

## 2022-02-11 PROCEDURE — 3008F BODY MASS INDEX DOCD: CPT | Mod: CPTII,S$GLB,, | Performed by: FAMILY MEDICINE

## 2022-02-11 PROCEDURE — 99213 PR OFFICE/OUTPT VISIT, EST, LEVL III, 20-29 MIN: ICD-10-PCS | Mod: S$GLB,,, | Performed by: FAMILY MEDICINE

## 2022-02-11 PROCEDURE — 3075F SYST BP GE 130 - 139MM HG: CPT | Mod: CPTII,S$GLB,, | Performed by: FAMILY MEDICINE

## 2022-02-11 PROCEDURE — 3075F PR MOST RECENT SYSTOLIC BLOOD PRESS GE 130-139MM HG: ICD-10-PCS | Mod: CPTII,S$GLB,, | Performed by: FAMILY MEDICINE

## 2022-02-11 PROCEDURE — 3078F DIAST BP <80 MM HG: CPT | Mod: CPTII,S$GLB,, | Performed by: FAMILY MEDICINE

## 2022-02-11 PROCEDURE — U0002: ICD-10-PCS | Mod: QW,S$GLB,, | Performed by: FAMILY MEDICINE

## 2022-02-11 PROCEDURE — 1159F MED LIST DOCD IN RCRD: CPT | Mod: CPTII,S$GLB,, | Performed by: FAMILY MEDICINE

## 2022-02-11 PROCEDURE — 99999 PR PBB SHADOW E&M-EST. PATIENT-LVL IV: ICD-10-PCS | Mod: PBBFAC,,, | Performed by: FAMILY MEDICINE

## 2022-02-11 PROCEDURE — 1159F PR MEDICATION LIST DOCUMENTED IN MEDICAL RECORD: ICD-10-PCS | Mod: CPTII,S$GLB,, | Performed by: FAMILY MEDICINE

## 2022-02-11 PROCEDURE — 3078F PR MOST RECENT DIASTOLIC BLOOD PRESSURE < 80 MM HG: ICD-10-PCS | Mod: CPTII,S$GLB,, | Performed by: FAMILY MEDICINE

## 2022-02-11 PROCEDURE — 3008F PR BODY MASS INDEX (BMI) DOCUMENTED: ICD-10-PCS | Mod: CPTII,S$GLB,, | Performed by: FAMILY MEDICINE

## 2022-02-11 PROCEDURE — 1160F PR REVIEW ALL MEDS BY PRESCRIBER/CLIN PHARMACIST DOCUMENTED: ICD-10-PCS | Mod: CPTII,S$GLB,, | Performed by: FAMILY MEDICINE

## 2022-02-11 PROCEDURE — 99213 OFFICE O/P EST LOW 20 MIN: CPT | Mod: S$GLB,,, | Performed by: FAMILY MEDICINE

## 2022-02-11 RX ORDER — CODEINE PHOSPHATE AND GUAIFENESIN 10; 100 MG/5ML; MG/5ML
5 SOLUTION ORAL EVERY 6 HOURS PRN
Qty: 120 ML | Refills: 0 | Status: SHIPPED | OUTPATIENT
Start: 2022-02-11

## 2022-02-11 NOTE — PROGRESS NOTES
"Subjective:       Patient ID: Sharifa Field is a 61 y.o. female.    Chief Complaint: Cough (X 3 days) and Sore Throat (X 3 days)    URI   This is a new problem. The current episode started in the past 7 days (3 days ago). The problem has been gradually improving. There has been no fever. Associated symptoms include congestion, coughing, ear pain, rhinorrhea and a sore throat. Pertinent negatives include no chest pain, diarrhea, headaches, nausea or vomiting. She has tried decongestant and acetaminophen for the symptoms. The treatment provided mild relief.     Review of Systems   Constitutional: Positive for fatigue. Negative for fever.   HENT: Positive for congestion, ear pain, rhinorrhea and sore throat.    Respiratory: Positive for cough.    Cardiovascular: Negative for chest pain.   Gastrointestinal: Negative for diarrhea, nausea and vomiting.   Neurological: Negative for headaches.       Objective:      Vitals:    02/11/22 1014   BP: 130/76   BP Location: Right arm   Patient Position: Sitting   BP Method: Medium (Manual)   Pulse: 73   Resp: 18   Temp: 98.2 °F (36.8 °C)   TempSrc: Oral   SpO2: 96%   Weight: 83.2 kg (183 lb 6.8 oz)   Height: 5' 9" (1.753 m)     Physical Exam  Vitals and nursing note reviewed.   Constitutional:       Appearance: She is well-developed and well-nourished.   HENT:      Head: Normocephalic and atraumatic.      Right Ear: Tympanic membrane normal.      Left Ear: Tympanic membrane normal.      Mouth/Throat:      Mouth: Mucous membranes are moist.      Pharynx: Oropharynx is clear.   Cardiovascular:      Rate and Rhythm: Normal rate and regular rhythm.      Heart sounds: Normal heart sounds.   Pulmonary:      Effort: Pulmonary effort is normal.      Breath sounds: Normal breath sounds.   Musculoskeletal:         General: No edema.      Cervical back: Neck supple.   Lymphadenopathy:      Cervical: No cervical adenopathy.   Skin:     General: Skin is warm and dry.   Neurological: "      Mental Status: She is alert and oriented to person, place, and time.   Psychiatric:         Mood and Affect: Mood normal.         Behavior: Behavior normal.         Lab Results   Component Value Date    WBC 7.25 10/09/2021    HGB 14.5 10/09/2021    HCT 43 10/09/2021     10/09/2021    CHOL 177 06/20/2019    TRIG 120 06/20/2019    HDL 37 (L) 06/20/2019    ALT 14 10/09/2021    AST 18 10/09/2021     10/09/2021    K 4.1 10/09/2021     10/09/2021    CREATININE 0.8 10/09/2021    BUN 14 10/09/2021    CO2 20 (L) 10/09/2021    TSH 2.53 06/08/2018    HGBA1C 5.4 10/10/2013      Assessment:       1. URI with cough and congestion        Plan:       URI with cough and congestion  -     POCT COVID-19 Rapid Screening  -     guaiFENesin-codeine 100-10 mg/5 ml (TUSSI-ORGANIDIN NR)  mg/5 mL syrup; Take 5 mLs by mouth every 6 (six) hours as needed.  Dispense: 120 mL; Refill: 0  Likely viral process, treat symptomatically     Medication List with Changes/Refills   New Medications    GUAIFENESIN-CODEINE 100-10 MG/5 ML (TUSSI-ORGANIDIN NR)  MG/5 ML SYRUP    Take 5 mLs by mouth every 6 (six) hours as needed.   Current Medications    ASCORBIC ACID, VITAMIN C, (VITAMIN C) 1000 MG TABLET    Take 1,000 mg by mouth 2 (two) times daily.    ATORVASTATIN (LIPITOR) 40 MG TABLET    Take 1 tablet (40 mg total) by mouth once daily.    EPINEPHRINE (EPIPEN) 0.3 MG/0.3 ML ATIN    Inject 0.3 mLs (0.3 mg total) into the muscle as needed.    MULTIVIT-MINERALS/FOLIC ACID (CENTRUM MULTIGUMMIES ORAL)    Take 1 capsule by mouth once daily.   Discontinued Medications    CLINDAMYCIN (CLEOCIN) 300 MG CAPSULE    Take 300 mg by mouth every 6 (six) hours.    FAMOTIDINE (PEPCID) 20 MG TABLET    Take 1 tablet (20 mg total) by mouth once daily. for 4 days    ONDANSETRON (ZOFRAN-ODT) 4 MG TBDL    Take 2 tablets (8 mg total) by mouth every 6 (six) hours as needed (nausea).    ZINC 50 MG TAB    Take 1 capsule by mouth once daily.

## 2022-02-11 NOTE — LETTER
February 11, 2022      [facility] [address]  [address], [address]  [place] [address]  Phone: [phone]  Fax: [phone]       Patient: Sharifa Field   YOB: 1960  Date of Visit: 02/11/2022    To Whom It May Concern:    Yulia Field  was at [facility] on 02/11/2022. Please excuse her absence 2/9 through 2/11/2022. She patient may return to work on Monday, 2/14/2022, with no restrictions. If you have any questions or concerns, or if I can be of further assistance, please do not hesitate to contact me.    Sincerely,        Akin Bell MD

## 2023-06-22 DIAGNOSIS — Z12.31 OTHER SCREENING MAMMOGRAM: ICD-10-CM

## 2023-06-27 ENCOUNTER — PATIENT MESSAGE (OUTPATIENT)
Dept: ADMINISTRATIVE | Facility: HOSPITAL | Age: 63
End: 2023-06-27

## 2025-01-02 ENCOUNTER — TELEPHONE (OUTPATIENT)
Dept: PRIMARY CARE CLINIC | Facility: CLINIC | Age: 65
End: 2025-01-02
Payer: MEDICARE

## 2025-01-02 ENCOUNTER — OCHSNER VIRTUAL EMERGENCY DEPARTMENT (OUTPATIENT)
Facility: CLINIC | Age: 65
End: 2025-01-02
Payer: MEDICARE

## 2025-01-02 ENCOUNTER — NURSE TRIAGE (OUTPATIENT)
Dept: ADMINISTRATIVE | Facility: CLINIC | Age: 65
End: 2025-01-02
Payer: MEDICARE

## 2025-01-02 ENCOUNTER — OFFICE VISIT (OUTPATIENT)
Dept: URGENT CARE | Facility: CLINIC | Age: 65
End: 2025-01-02
Payer: MEDICARE

## 2025-01-02 VITALS
SYSTOLIC BLOOD PRESSURE: 146 MMHG | OXYGEN SATURATION: 98 % | HEART RATE: 78 BPM | DIASTOLIC BLOOD PRESSURE: 87 MMHG | RESPIRATION RATE: 17 BRPM | WEIGHT: 183 LBS | TEMPERATURE: 99 F | BODY MASS INDEX: 27.11 KG/M2 | HEIGHT: 69 IN

## 2025-01-02 DIAGNOSIS — B02.8 ZOSTER WITH OTHER COMPLICATIONS: Primary | ICD-10-CM

## 2025-01-02 DIAGNOSIS — L03.213 PERIORBITAL CELLULITIS OF RIGHT EYE: ICD-10-CM

## 2025-01-02 PROCEDURE — 99213 OFFICE O/P EST LOW 20 MIN: CPT | Mod: S$GLB,,, | Performed by: FAMILY MEDICINE

## 2025-01-02 RX ORDER — POLYMYXIN B SULFATE AND TRIMETHOPRIM 1; 10000 MG/ML; [USP'U]/ML
1 SOLUTION OPHTHALMIC EVERY 6 HOURS
Qty: 10 ML | Refills: 0 | Status: SHIPPED | OUTPATIENT
Start: 2025-01-02 | End: 2025-01-09

## 2025-01-02 RX ORDER — VALACYCLOVIR HYDROCHLORIDE 1 G/1
1000 TABLET, FILM COATED ORAL 3 TIMES DAILY
Qty: 21 TABLET | Refills: 0 | Status: SHIPPED | OUTPATIENT
Start: 2025-01-02 | End: 2025-01-09

## 2025-01-02 RX ORDER — CEPHALEXIN 500 MG/1
500 CAPSULE ORAL EVERY 8 HOURS
Qty: 21 CAPSULE | Refills: 0 | Status: SHIPPED | OUTPATIENT
Start: 2025-01-02 | End: 2025-01-09

## 2025-01-02 NOTE — PATIENT INSTRUCTIONS
You will need to be reevaluated in 7 days; sooner if any worsening redness/ swelling/ blisters/ vision disturbance.

## 2025-01-02 NOTE — TELEPHONE ENCOUNTER
"Pt stated she had a head cold for about two weeks but that's going away. Pain in right temple, right eye is swollen and has a rash around it. Pain is sharp 8/10 and she feels it with movement. Pt stated she can see out of her eye. Rash is different in different places. Appears like bumps in some places and eczema in other place. Rt eye is draining white drainage. Pt stated this is the worst headache of her life like someone is stabbing her head. Pain is not constant. Care advice recommends pt go to Er or office with md approval. Shelly provider Dr. Benitez notified. Dr Benitez stated since there were no pcp appts available today "She is fine to start at . Sounds like she may have a periorbital cellulitis from a bacterial sinusitis and needs some pain control and an antibiotic". Pt informed and verbalized understanding.   Reason for Disposition   SEVERE headache, states 'worst headache' of life    Additional Information   Negative: Difficult to awaken or acting confused (e.g., disoriented, slurred speech)   Negative: Weakness of the face, arm or leg on one side of the body and new-onset   Negative: Numbness of the face, arm or leg on one side of the body and new-onset   Negative: Loss of speech or garbled speech and new-onset   Negative: Passed out (e.g., fainted, lost consciousness, blacked out and was not responding)   Negative: Sounds like a life-threatening emergency to the triager   Negative: Unable to walk without falling   Negative: Stiff neck (can't touch chin to chest)   Negative: Other family members (or people in same household) with headaches and possibility of carbon monoxide exposure    Protocols used: Headache-A-OH    "

## 2025-01-02 NOTE — PLAN OF CARE-OVED
[facility] Emergency Department Plan of Care Note    Referral source: Nurse On-Call      Reason for consult: Headache      Additional History: 63yo F with recent URI, now with eyelid swelling and temporal headache. No vision changes, no pain on eye movement.       Disposition recommended: Urgent Care      Additional Recommendations: Patient may have periorbital cellulitis from recent URI with possible bacterial sinusitis. Needs pain control and possible abx, check eye pressure.

## 2025-01-02 NOTE — PROGRESS NOTES
"Subjective:      Patient ID: Sharifa Field is a 64 y.o. female.    Vitals:  height is 5' 9" (1.753 m) and weight is 83 kg (183 lb). Her tympanic temperature is 99.1 °F (37.3 °C). Her blood pressure is 146/87 (abnormal) and her pulse is 78. Her respiration is 17 and oxygen saturation is 98%.     Chief Complaint: Eye Pain    64 y.o female c/o right eye pain started x 4 days ago. Patient reports that she was putting on makeup and something went into eye. Patient reports that she has a rash around eye and a sharp pain that comes and goes.     Eye Pain   The right eye is affected. This is a new problem. The current episode started in the past 7 days. The problem occurs constantly. The problem has been gradually worsening. There was no injury mechanism. The pain is at a severity of 4/10. The pain is mild. There is No known exposure to pink eye. She Does not wear contacts. Associated symptoms include eye redness and itching. Pertinent negatives include no blurred vision, eye discharge, double vision, fever, foreign body sensation, nausea, photophobia, recent URI or vomiting. She has tried nothing for the symptoms. The treatment provided no relief.       Constitution: Negative for fever.   Eyes:  Positive for eye itching, eye pain and eye redness. Negative for eye discharge, photophobia, double vision and blurred vision.   Gastrointestinal:  Negative for nausea and vomiting.      Objective:     Vitals:    01/02/25 1100   BP: (!) 146/87   BP Location: Left arm   Patient Position: Sitting   Pulse: 78   Resp: 17   Temp: 99.1 °F (37.3 °C)   TempSrc: Tympanic   SpO2: 98%   Weight: 83 kg (183 lb)   Height: 5' 9" (1.753 m)      Physical Exam   Eyes:      Comments: Right conjunctiva   Neurological: She is alert.   Skin: Skin is rash (blister).       Assessment:     1. Zoster with other complications    2. Periorbital cellulitis of right eye        Plan:       Zoster with other complications  -     valACYclovir (VALTREX) " 1000 MG tablet; Take 1 tablet (1,000 mg total) by mouth 3 (three) times daily. for 7 days  Dispense: 21 tablet; Refill: 0    2. Periorbital cellulitis of right eye  -     polymyxin B sulf-trimethoprim (POLYTRIM) 10,000 unit- 1 mg/mL Drop; Place 1 drop into the right eye every 6 (six) hours. May substitute with ofloxacin 0.3% opht 1 drop every 6 hrs X 7 days for 7 days  Dispense: 10 mL; Refill: 0  -     cephALEXin (KEFLEX) 500 MG capsule; Take 1 capsule (500 mg total) by mouth every 8 (eight) hours. for 7 days  Dispense: 21 capsule; Refill: 0      Patient Instructions   You will need to be reevaluated in 7 days; sooner if any worsening redness/ swelling/ blisters/ vision disturbance.

## 2025-01-09 DIAGNOSIS — Z78.0 MENOPAUSE: ICD-10-CM

## 2025-01-11 ENCOUNTER — OFFICE VISIT (OUTPATIENT)
Dept: URGENT CARE | Facility: CLINIC | Age: 65
End: 2025-01-11
Payer: MEDICARE

## 2025-01-11 VITALS
HEART RATE: 81 BPM | SYSTOLIC BLOOD PRESSURE: 119 MMHG | RESPIRATION RATE: 16 BRPM | HEIGHT: 69 IN | TEMPERATURE: 99 F | WEIGHT: 183 LBS | DIASTOLIC BLOOD PRESSURE: 80 MMHG | BODY MASS INDEX: 27.11 KG/M2 | OXYGEN SATURATION: 96 %

## 2025-01-11 DIAGNOSIS — L81.0 POSTINFLAMMATORY HYPERPIGMENTATION: Primary | ICD-10-CM

## 2025-01-11 DIAGNOSIS — B02.8 ZOSTER WITH OTHER COMPLICATIONS: ICD-10-CM

## 2025-01-11 PROCEDURE — 99213 OFFICE O/P EST LOW 20 MIN: CPT | Mod: S$GLB,,, | Performed by: PHYSICIAN ASSISTANT

## 2025-01-11 RX ORDER — POLYMYXIN B SULFATE AND TRIMETHOPRIM 1; 10000 MG/ML; [USP'U]/ML
SOLUTION OPHTHALMIC
COMMUNITY
Start: 2025-01-02 | End: 2025-01-11

## 2025-01-11 RX ORDER — FLUOROMETHOLONE 1 MG/ML
1 SUSPENSION/ DROPS OPHTHALMIC 3 TIMES DAILY
COMMUNITY
Start: 2025-01-03

## 2025-01-11 RX ORDER — ERYTHROMYCIN 5 MG/G
OINTMENT OPHTHALMIC
COMMUNITY
Start: 2025-01-03

## 2025-01-11 NOTE — PATIENT INSTRUCTIONS
You are no longer contagious.  Continue following up with Ophthalmology as recommended.  Educational material attached to the pamphlet.  Tylenol with food as needed for pain relief.  If you have long-term nerve complications as we discussed follow up with your primary care provider

## 2025-01-11 NOTE — PROGRESS NOTES
"Subjective:      Patient ID: Sharifa Field is a 64 y.o. female.    Vitals:  height is 5' 9" (1.753 m) and weight is 83 kg (183 lb). Her tympanic temperature is 98.6 °F (37 °C). Her blood pressure is 119/80 and her pulse is 81. Her respiration is 16 and oxygen saturation is 96%.     Chief Complaint: Rash    Patient seen here for rash around and in right eye on 1/2/2025.Patient reports she was told to come back for a follow up after 1 week.Patient has seen Eye doctor twice and has one more appointment next friday.Patient wants to know if she is ok to be around children and elderly people.Eye dotor gave her erythromycine ointment and fluorometholone.    Rash  This is a new problem. The current episode started 1 to 4 weeks ago. The problem has been gradually improving since onset. The affected locations include the face (in and around right eye). Pertinent negatives include no fever. Treatments tried: erythromycine oinment  and fluorometholone. The treatment provided mild relief. There is no history of eczema.       Constitution: Negative for chills and fever.   HENT:  Positive for ear pain. Negative for ear discharge (right ear) and facial swelling.         Numbness on right side of head   Skin:  Negative for rash.   Neurological:         Abnormal sensation to right forehead      Past Medical History:   Diagnosis Date    Genital warts     GERD (gastroesophageal reflux disease)     H/O pheochromocytoma     Hyperlipidemia        Past Surgical History:   Procedure Laterality Date    ABDOMINAL ADHESION SURGERY      APPENDECTOMY N/A 2/1/2017    Procedure: APPENDECTOMY;  Surgeon: Mando Mccarthy MD;  Location: [facility] OR [address] [place] [address];  Service: General;  Laterality: N/A;    CERVICAL CONIZATION   W/ LASER      COLONOSCOPY N/A 8/1/2016    Procedure: COLONOSCOPY;  Surgeon: MIRNA Hawk MD;  Location: [facility] (4TH FLR);  Service: Endoscopy;  Laterality: N/A;    TUMOR REMOVAL  2004       Family History   Problem " Relation Name Age of Onset    Diabetes Mother      Stroke Mother      Heart disease Father      Hypertension Father      Heart attack Father      Cancer Maternal Aunt      Breast cancer Maternal Aunt      Breast cancer Cousin      Colon cancer Neg Hx      Ovarian cancer Neg Hx         Social History     Socioeconomic History    Marital status:    Occupational History     Employer: [employer]   Tobacco Use    Smoking status: Never    Smokeless tobacco: Never   Substance and Sexual Activity    Alcohol use: Yes     Alcohol/week: 0.0 standard drinks of alcohol     Comment: occasionally     Drug use: No    Sexual activity: Yes     Partners: Male   Social History Narrative    Lives w/.     Social Drivers of Health     Stress: No Stress Concern Present (6/20/2019)    [facility] - Occupational Stress Questionnaire     Feeling of Stress : Not at all       Current Outpatient Medications   Medication Sig Dispense Refill    ascorbic acid, vitamin C, (VITAMIN C) 1000 MG tablet Take 1,000 mg by mouth 2 (two) times daily.      erythromycin (ROMYCIN) ophthalmic ointment SMARTSIG:sparingly Right Eye 3 Times Daily      fluorometholone 0.1% (FML) 0.1 % DrpS Place 1 drop into the left eye 3 (three) times daily.      guaiFENesin-codeine 100-10 mg/5 ml (TUSSI-ORGANIDIN NR)  mg/5 mL syrup Take 5 mLs by mouth every 6 (six) hours as needed. 120 mL 0    multivit-minerals/folic acid (CENTRUM MULTIGUMMIES ORAL) Take 1 capsule by mouth once daily.       No current facility-administered medications for this visit.       Review of patient's allergies indicates:   Allergen Reactions    Shellfish containing products Rash     Likely shellfish allergy after cleaning and eating shrimp awoke with red itching rash whole body     Bactrim [sulfamethoxazole-trimethoprim] Rash       Objective:     Physical Exam   Constitutional:  Non-toxic appearance. She does not appear ill. No distress.   HENT:    Head: Normocephalic and atraumatic.   Ears:   Right Ear: Hearing, tympanic membrane, external ear and ear canal normal. Tympanic membrane is not erythematous and not bulging. No middle ear effusion.   Left Ear: Tympanic membrane, external ear and ear canal normal. Tympanic membrane is not erythematous and not bulging.  No middle ear effusion.   Pulmonary/Chest: Effort normal. No respiratory distress.   Abdominal: Normal appearance.   Neurological: She is alert.   Skin: Skin is warm, dry, not diaphoretic and no rash.        Nursing note and vitals reviewed.      Assessment:     1. Postinflammatory hyperpigmentation    2. Zoster with other complications        Plan:       Postinflammatory hyperpigmentation    Zoster with other complications           I have reviewed the patient chart and pertinent past imaging/labs.  Patient advised to continue following with eye doctor she is no longer contagious.     Patient Instructions   You are no longer contagious.  Continue following up with Ophthalmology as recommended.  Educational material attached to the pamphlet.  Tylenol with food as needed for pain relief.  If you have long-term nerve complications as we discussed follow up with your primary care provider

## 2025-01-14 ENCOUNTER — HOSPITAL ENCOUNTER (EMERGENCY)
Facility: OTHER | Age: 65
Discharge: HOME | End: 2025-01-14
Attending: EMERGENCY MEDICINE
Payer: MEDICARE

## 2025-01-14 VITALS
WEIGHT: 185 LBS | TEMPERATURE: 98 F | BODY MASS INDEX: 27.4 KG/M2 | HEIGHT: 69 IN | RESPIRATION RATE: 18 BRPM | SYSTOLIC BLOOD PRESSURE: 147 MMHG | HEART RATE: 67 BPM | OXYGEN SATURATION: 100 % | DIASTOLIC BLOOD PRESSURE: 71 MMHG

## 2025-01-14 DIAGNOSIS — B02.9 HERPES ZOSTER WITHOUT COMPLICATION: Primary | ICD-10-CM

## 2025-01-14 PROCEDURE — 99283 EMERGENCY DEPT VISIT LOW MDM: CPT

## 2025-01-14 RX ORDER — GABAPENTIN 100 MG/1
100 CAPSULE ORAL 3 TIMES DAILY
Qty: 90 CAPSULE | Refills: 0 | Status: SHIPPED | OUTPATIENT
Start: 2025-01-14 | End: 2025-01-19 | Stop reason: ALTCHOICE

## 2025-01-14 NOTE — ED PROVIDER NOTES
Encounter Date: 1/14/2025       History     Chief Complaint   Patient presents with    Herpes Zoster     Dx with shingles 1/2. States she's still having symptoms of redness/pain to r side of face, L arm tingling, neck stiffness and headache.      64-year-old female which presents to the emergency room with continued burning sensations to her face and head that began after being diagnosed with shingles a couple of weeks ago.  Patient states that she has been seen at Urgent Care twice for the pain they have not given her any other medication.  She denies any other symptoms.    The history is provided by the patient.     Review of patient's allergies indicates:   Allergen Reactions    Shellfish containing products Rash     Likely shellfish allergy after cleaning and eating shrimp awoke with red itching rash whole body     Bactrim [sulfamethoxazole-trimethoprim] Rash     Past Medical History:   Diagnosis Date    Genital warts     GERD (gastroesophageal reflux disease)     H/O pheochromocytoma     Hyperlipidemia      Past Surgical History:   Procedure Laterality Date    ABDOMINAL ADHESION SURGERY      APPENDECTOMY N/A 2/1/2017    Procedure: APPENDECTOMY;  Surgeon: Mando Mccarthy MD;  Location: [facility] OR [address] [place] [address];  Service: General;  Laterality: N/A;    CERVICAL CONIZATION   W/ LASER      COLONOSCOPY N/A 8/1/2016    Procedure: COLONOSCOPY;  Surgeon: MIRNA Hawk MD;  Location: [facility] (4TH FLR);  Service: Endoscopy;  Laterality: N/A;    TUMOR REMOVAL  2004     Family History   Problem Relation Name Age of Onset    Diabetes Mother      Stroke Mother      Heart disease Father      Hypertension Father      Heart attack Father      Cancer Maternal Aunt      Breast cancer Maternal Aunt      Breast cancer Cousin      Colon cancer Neg Hx      Ovarian cancer Neg Hx       Social History     Tobacco Use    Smoking status: Never    Smokeless tobacco: Never   Substance Use Topics    Alcohol use: Yes     Alcohol/week: 0.0  standard drinks of alcohol     Comment: occasionally     Drug use: No     Review of Systems   Constitutional:  Negative for fever.   HENT:  Negative for sore throat.    Respiratory:  Negative for shortness of breath.    Cardiovascular:  Negative for chest pain.   Gastrointestinal:  Negative for nausea.   Genitourinary:  Negative for dysuria.   Musculoskeletal:  Negative for back pain.   Skin:  Positive for rash.   Neurological:  Negative for weakness.   Hematological:  Does not bruise/bleed easily.   All other systems reviewed and are negative.      Physical Exam     Initial Vitals [01/14/25 1006]   BP Pulse Resp Temp SpO2   (!) 147/71 67 18 97.9 °F (36.6 °C) 100 %      MAP       --         Physical Exam    Nursing note and vitals reviewed.  Constitutional: She appears well-developed and well-nourished.   HENT:   Head: Normocephalic and atraumatic.   No lesions noted to right TM   Eyes: Conjunctivae and EOM are normal. Pupils are equal, round, and reactive to light.   Neck:   Normal range of motion.  Cardiovascular:  Normal rate, regular rhythm, normal heart sounds and intact distal pulses.     Exam reveals no gallop and no friction rub.       No murmur heard.  Pulmonary/Chest: Breath sounds normal. No respiratory distress. She has no wheezes. She has no rhonchi. She has no rales. She exhibits no tenderness.   Musculoskeletal:         General: No tenderness or edema. Normal range of motion.      Cervical back: Normal range of motion.     Neurological: She is alert and oriented to person, place, and time. She has normal strength. GCS score is 15. GCS eye subscore is 4. GCS verbal subscore is 5. GCS motor subscore is 6.   Skin: Skin is warm. Capillary refill takes less than 2 seconds. Rash noted. No erythema.   Healing vesicular lesions noted to the right side of the forehead and right upper eyelid-no evidence of cellulitis   Psychiatric: She has a normal mood and affect.       ED Course   Procedures  Labs Reviewed -  No data to display       Imaging Results    None          Medications - No data to display  Medical Decision Making  64-year-old female which presents to the emergency room with continued burning and sharp pains to her face and head after being diagnosed with shingles 2 weeks ago.  Patient does not have any paralysis of the face or lesions to her ear.  No evidence of vertigo on exam.  Patient will be discharged with gabapentin to help with her nerve pain. Patient given strict return precautions and voiced understanding of all discharge instructions. Pt was stable at discharge.       Differential diagnosis: Post herpetic neuralgia, Ariana Blank, cellulitis    Problems Addressed:  Herpes zoster without complication: acute illness or injury    Risk  Prescription drug management.               ED Course as of 01/14/25 1317   Tue Jan 14, 2025   1045 BP(!): 147/71 [AT]   1045 Temp: 97.9 °F (36.6 °C) [AT]   1045 Temp Source: Oral [AT]   1045 Pulse: 67 [AT]   1045 Resp: 18 [AT]   1045 SpO2: 100 % [AT]      ED Course User Index  [AT] Susy Michel FNP                           Clinical Impression:  Final diagnoses:  [B02.9] Herpes zoster without complication (Primary)          ED Disposition Condition    Discharge Stable          ED Prescriptions       Medication Sig Dispense Start Date End Date Auth. Provider    gabapentin (NEURONTIN) 100 MG capsule Take 1 capsule (100 mg total) by mouth 3 (three) times daily. 90 capsule 1/14/2025 2/13/2025 Susy Michel FNP          Follow-up Information       Follow up With Specialties Details Why Contact Info    Kofi Vieira MD Internal Medicine, Pediatrics Schedule an appointment as soon as possible for a visit  As needed [address] [place] [address]  [phone]               Susy Michel FNP  01/14/25 7700

## 2025-01-14 NOTE — ED TRIAGE NOTES
Pt presents to ED today c/o pain to head, face, and ears secondary to shingles outbreak   Reports she completed prescribed medication with minimal relief

## 2025-01-14 NOTE — FIRST PROVIDER EVALUATION
Emergency Department TeleTriage Encounter Note      CHIEF COMPLAINT    Chief Complaint   Patient presents with    Herpes Zoster     Dx with shingles 12/2. States she's still having symptoms of redness/pain to r side of face, L arm tingling, neck stiffness and headache.        VITAL SIGNS   Initial Vitals [01/14/25 1006]   BP Pulse Resp Temp SpO2   (!) 147/71 67 18 97.9 °F (36.6 °C) 100 %      MAP       --            ALLERGIES    Review of patient's allergies indicates:   Allergen Reactions    Shellfish containing products Rash     Likely shellfish allergy after cleaning and eating shrimp awoke with red itching rash whole body     Bactrim [sulfamethoxazole-trimethoprim] Rash       PROVIDER TRIAGE NOTE  Verbal consent for the teletriage evaluation was given by the patient at the start of the evaluation.  All efforts will be made to maintain patient's privacy during the evaluation.      This is a teletriage evaluation of a 64 y.o. female presenting to the ED with c/o diagnosed with shingles to right side of face on 1/2/2025; began with numbness and pain on the right side that worsened 1/11/2025. Limited physical exam via telehealth: The patient is awake, alert, answering questions appropriately and is not in respiratory distress.  As the Teletriage provider, I performed an initial assessment and ordered appropriate labs and imaging studies, if any, to facilitate the patient's care once placed in the ED. Once a room is available, care and a full evaluation will be completed by an alternate ED provider.  Any additional orders and the final disposition will be determined by that provider.  All imaging and labs will not be followed-up by the Teletriage Team, including myself.      ORDERS  Labs Reviewed - No data to display    ED Orders (720h ago, onward)      None              Virtual Visit Note: The provider triage portion of this emergency department evaluation and documentation was performed via [employer], a  HIPAA-compliant telemedicine application, in concert with a tele-presenter in the room. A face to face patient evaluation with one of my colleagues will occur once the patient is placed in an emergency department room.      DISCLAIMER: This note was prepared with [employer] voice recognition transcription software. Garbled syntax, mangled pronouns, and other bizarre constructions may be attributed to that software system.

## 2025-01-19 ENCOUNTER — HOSPITAL ENCOUNTER (EMERGENCY)
Facility: OTHER | Age: 65
Discharge: HOME | End: 2025-01-19
Attending: EMERGENCY MEDICINE
Payer: MEDICARE

## 2025-01-19 VITALS
TEMPERATURE: 99 F | RESPIRATION RATE: 16 BRPM | HEIGHT: 69 IN | HEART RATE: 74 BPM | OXYGEN SATURATION: 99 % | BODY MASS INDEX: 27.4 KG/M2 | WEIGHT: 185 LBS | DIASTOLIC BLOOD PRESSURE: 82 MMHG | SYSTOLIC BLOOD PRESSURE: 146 MMHG

## 2025-01-19 DIAGNOSIS — B02.22 HZV (HERPES ZOSTER VIRUS) POST HERPETIC TRIGEMINAL NEURALGIA: Primary | ICD-10-CM

## 2025-01-19 DIAGNOSIS — B02.9 HERPES ZOSTER WITHOUT COMPLICATION: ICD-10-CM

## 2025-01-19 PROCEDURE — 25000003 PHARM REV CODE 250: Performed by: EMERGENCY MEDICINE

## 2025-01-19 PROCEDURE — 99284 EMERGENCY DEPT VISIT MOD MDM: CPT

## 2025-01-19 RX ORDER — GABAPENTIN 300 MG/1
300 CAPSULE ORAL
Status: COMPLETED | OUTPATIENT
Start: 2025-01-19 | End: 2025-01-19

## 2025-01-19 RX ORDER — HYDROCODONE BITARTRATE AND ACETAMINOPHEN 5; 325 MG/1; MG/1
1 TABLET ORAL
Status: COMPLETED | OUTPATIENT
Start: 2025-01-19 | End: 2025-01-19

## 2025-01-19 RX ORDER — HYDROCODONE BITARTRATE AND ACETAMINOPHEN 5; 325 MG/1; MG/1
1 TABLET ORAL EVERY 4 HOURS PRN
Qty: 10 TABLET | Refills: 0 | Status: SHIPPED | OUTPATIENT
Start: 2025-01-19 | End: 2025-01-22

## 2025-01-19 RX ORDER — GABAPENTIN 300 MG/1
300 CAPSULE ORAL 3 TIMES DAILY
Qty: 42 CAPSULE | Refills: 1 | Status: SHIPPED | OUTPATIENT
Start: 2025-01-19 | End: 2025-01-28

## 2025-01-19 RX ADMIN — HYDROCODONE BITARTRATE AND ACETAMINOPHEN 1 TABLET: 5; 325 TABLET ORAL at 04:01

## 2025-01-19 RX ADMIN — GABAPENTIN 300 MG: 300 CAPSULE ORAL at 04:01

## 2025-01-19 NOTE — DISCHARGE INSTRUCTIONS

## 2025-01-19 NOTE — ED PROVIDER NOTES
"  Source of History:  Medical record, patient, patient's       Chief complaint:  Per triage note: "Headache (Finished shingles med 1/9. Reports headache since then. Reports "stabbing" sensation to top of the head" radiating to the face. Reports tightness to forehead. Has been taking gabapentin without relief. )  "    HPI:    Patient presents for continued right scalp, facial pain after diagnosed with herpes zoster on January 2 after several days of symptoms.  On the day of diagnosed as she was started on valacyclovir, cephalexin, was not given anything specifically for pain.  She presented here January 14 with continued pain, improved rash, was started on gabapentin 100 mg TID but symptoms continue.  Patient states that she saw an ophthalmologist and was started on erythromycin and steroid drops (review of the chart does not reflect this encounter she states occurred at a [facility] facility).  She denies any vision changes or globe pain.   She denies any facial droop, but her has been states that he has noticed a facial droop soon after her symptoms started which has been stable.  She denies any hearing change.      ROS:   See HPI for pertinent review of systems      Review of patient's allergies indicates:   Allergen Reactions    Shellfish containing products Rash     Likely shellfish allergy after cleaning and eating shrimp awoke with red itching rash whole body     Bactrim [sulfamethoxazole-trimethoprim] Rash       PMH:  As per HPI and below:  Past Medical History:   Diagnosis Date    GERD (gastroesophageal reflux disease)     H/O pheochromocytoma     Hyperlipidemia        Past Surgical History:   Procedure Laterality Date    ABDOMINAL ADHESION SURGERY      APPENDECTOMY N/A 2/1/2017    Procedure: APPENDECTOMY;  Surgeon: Mando Mccarthy MD;  Location: [facility] OR [address] [place] [address];  Service: General;  Laterality: N/A;    CERVICAL CONIZATION   W/ LASER      COLONOSCOPY N/A 8/1/2016    Procedure: COLONOSCOPY;  Surgeon: MIRNA" "Gopi Hawk MD;  Location: [facility] ([address] [place] [address]);  Service: Endoscopy;  Laterality: N/A;    TUMOR REMOVAL  2004       Social History     Tobacco Use    Smoking status: Never    Smokeless tobacco: Never   Substance Use Topics    Alcohol use: Yes     Alcohol/week: 0.0 standard drinks of alcohol     Comment: occasionally     Drug use: No       Physical Exam:      Nursing note and vitals reviewed.  BP (!) 146/82   Pulse 74   Temp 98.7 °F (37.1 °C) (Oral)   Resp 16   Ht 5' 9" (1.753 m)   Wt 83.9 kg (185 lb)   SpO2 99%   Breastfeeding No   BMI 27.32 kg/m²     Constitutional: AAOx3. Well appearing.   Eyes: EOMI. No discharge. Anicteric.  No injection.  HENT:  Mild right forehead and periorbital edema.  High paresthesias in V1, V2 distribution.    Mouth/Throat:    Neck: Normal range of motion. Neck supple.    Cardiovascular: Normal rate  Pulmonary/Chest: No respiratory distress.   Abdominal: No distension   Musculoskeletal: Normal range of motion.   Neurological: GCS15. Alert and oriented to person, place, and time. No gross cranial nerve II-XII, focal strength, or light touch deficit. Steady gait. Mild facial droop patient's  states has been present since symptom onset and his stable.  Skin: Skin is warm and dry.         Medical Decision Making / Independent Interpretations / External Records Reviewed:      Patient is a 64-year-old female with history of GERD, history of pheochromocytoma who presents for evaluation of continued right facial and scalp pain after diagnosed with herpes zoster January 4.  She was started on gabapentin on January 14, however pain continues.  Clinically, patient has post herpetic trigeminal neuralgia.  History and physical is not consistent with corneal involvement.   Adding additional neuropathy agent was considered, however patient not currently having fair trial of gabapentin, several increase this.  Given the degree of her pain, discomfort, disruption to her life (reports she " has been hardly sleeping because she can not not find a comfortable position) will also prescribe opioids after discussion of the risks and benefits.  Also referring patient to Neurology and PT.  --  I discussed with pt and/or guardian/caretaker that this evaluation in the ED does not suggest any emergent or life threatening medical condition requiring admission or further immediate intervention or diagnostics. Regardless, an unremarkable evaluation in the ED does not preclude the development or presence of a serious or life threatening condition. Pt was instructed to return for any worsening, new, changed, or concerning symptoms.     I had a detailed discussion with patient and/or guardian/caretaker regarding findings, plan, return precautions, importance of medication adherence, need to follow-up with a PCP and specialist. All questions answered.     Note was created using voice recognition software. It may have occasional typographical errors not identified and edited despite initial review prior to signing.                  Procedures    --  I decided to obtain the patient's medical records. I reviewed patient's prior external notes / results: urgent care note.   --  Additional Medical Decision Making: Prescription drug management    Pt seen at a time of critical national shortage of IV fluids, affecting decision making and treatment considerations.       Medications   gabapentin capsule 300 mg (300 mg Oral Given 1/19/25 1624)   HYDROcodone-acetaminophen 5-325 mg per tablet 1 tablet (1 tablet Oral Given 1/19/25 1624)              No future appointments.       Diagnostic Impression:    1. HZV (herpes zoster virus) post herpetic trigeminal neuralgia    2. Herpes zoster without complication             ED Disposition Condition    Discharge Good          ED Prescriptions       Medication Sig Dispense Start Date End Date Auth. Provider    gabapentin (NEURONTIN) 300 MG capsule Take 1 capsule (300 mg total) by mouth 3  (three) times daily. for 14 days 42 capsule 1/19/2025 2/2/2025 Julian Hollins MD    HYDROcodone-acetaminophen (NORCO) 5-325 mg per tablet Take 1 tablet by mouth every 4 (four) hours as needed (severe pain not improved by other measures.). 10 tablet 1/19/2025 1/22/2025 Julian Hollins MD          Follow-up Information       Follow up With Specialties Details Why Contact Info Additional Information    Kofi Vieira MD Internal Medicine, Pediatrics Schedule an appointment as soon as possible for a visit  For recheck with your primary care doctor [address] [place] [address]  [phone]       [facility] [place] Neurology Schedule an appointment as soon as possible for a visit  For recheck with specialist [address], [address] [address] [phone] Turn at Entrance 1 on [facility] in [facility] [place] and take elevators to Floor 2. Follow signs to [facility]. Take [facility] Elevators to Floor 5 for [address].               Julian Hollins MD  01/20/25 1232       Julian Hollins MD  01/20/25 1230

## 2025-01-21 ENCOUNTER — PATIENT OUTREACH (OUTPATIENT)
Dept: ADMINISTRATIVE | Facility: OTHER | Age: 65
End: 2025-01-21
Payer: MEDICARE

## 2025-01-21 NOTE — PROGRESS NOTES
CHW - Outreach Attempt    Community Health Worker left a voicemail message for 1st attempt to contact patient regarding: sdoh questionnaire     Community Health Worker to attempt to contact patient on: 1/21/25

## 2025-01-23 ENCOUNTER — PATIENT OUTREACH (OUTPATIENT)
Dept: ADMINISTRATIVE | Facility: OTHER | Age: 65
End: 2025-01-23
Payer: MEDICARE

## 2025-01-23 NOTE — PROGRESS NOTES
"CHW - Case Closure    This Community Health Worker spoke to patient via telephone today.     Pt/Caregiver reported: pt stated she doesn't have any social needs     But needed medical advise because she's still having "stabbing" pain in her head and may need a refill for pain medicine    Pt/Caregiver denied any additional needs at this time and agrees with episode closure at this time.  Provided patient with Community Health Worker's contact information and encouraged him/her to contact this Community Health Worker if additional needs arise.       "

## 2025-01-24 ENCOUNTER — TELEPHONE (OUTPATIENT)
Dept: PRIMARY CARE CLINIC | Facility: CLINIC | Age: 65
End: 2025-01-24
Payer: MEDICARE

## 2025-01-24 ENCOUNTER — TELEPHONE (OUTPATIENT)
Dept: NEUROLOGY | Facility: CLINIC | Age: 65
End: 2025-01-24
Payer: MEDICARE

## 2025-01-24 NOTE — TELEPHONE ENCOUNTER
----- Message from Malou sent at 1/22/2025  2:41 PM CST -----  Contact: Pt [phone]  Requesting an RX refill or new RX.    Is this a refill or new RX: new rx    RX name and strength (copy/paste from chart):  HYDROcodone-acetaminophen (NORCO) 5-325 mg per tablet    Is this a 30 day or 90 day RX:   10 tabs    Pharmacy name and phone # (copy/paste from chart):    [facility] #[id number] - [address] AT [address] [address]  Phone: [phone] Fax: [phone]         Please Call and advise    Thank you    Please do NOT rep[ly to sender as this is from the call center and they answer incoming calls only.

## 2025-01-24 NOTE — TELEPHONE ENCOUNTER
----- Message from Malou sent at 1/22/2025  2:41 PM CST -----  Contact: Pt [phone]  Requesting an RX refill or new RX.    Is this a refill or new RX: new rx    RX name and strength (copy/paste from chart):  HYDROcodone-acetaminophen (NORCO) 5-325 mg per tablet    Is this a 30 day or 90 day RX:   10 tabs    Pharmacy name and phone # (copy/paste from chart):    [facility] #[id number] - [address] AT [address] [address]  Phone: [phone] Fax: [phone]         Please Call and advise    Thank you    Please do NOT rep[ly to sender as this is from the call center and they answer incoming calls only.

## 2025-01-24 NOTE — TELEPHONE ENCOUNTER
----- Message from Carin sent at 1/23/2025  3:05 PM CST -----  Contact: Patient [phone]  Requesting an RX refill or new RX.    Is this a refill or new RX:     RX name and strength HYDROcodone-acetaminophen (NORCO) 5-325 mg per tablet    Is this a 30 day or 90 day RX:     Pharmacy name and phone # [email or web address] #[id number] - [facility] [id number] [place] [address] AT [facility]        The doctors have asked that we provide their patients with the following 2 reminders -- prescription refills can take up to 72 hours, and a friendly reminder that in the future you can use your [facility] account to request refills: yes

## 2025-01-28 ENCOUNTER — OFFICE VISIT (OUTPATIENT)
Dept: PRIMARY CARE CLINIC | Facility: CLINIC | Age: 65
End: 2025-01-28
Payer: MEDICARE

## 2025-01-28 VITALS
DIASTOLIC BLOOD PRESSURE: 73 MMHG | WEIGHT: 186.75 LBS | OXYGEN SATURATION: 96 % | SYSTOLIC BLOOD PRESSURE: 117 MMHG | BODY MASS INDEX: 27.66 KG/M2 | HEIGHT: 69 IN | HEART RATE: 74 BPM

## 2025-01-28 DIAGNOSIS — Z79.899 ENCOUNTER FOR LONG-TERM CURRENT USE OF MEDICATION: ICD-10-CM

## 2025-01-28 DIAGNOSIS — Z13.1 SCREENING FOR DIABETES MELLITUS: ICD-10-CM

## 2025-01-28 DIAGNOSIS — B02.22 POST-HERPETIC TRIGEMINAL NEURALGIA: Primary | ICD-10-CM

## 2025-01-28 PROCEDURE — 1125F AMNT PAIN NOTED PAIN PRSNT: CPT | Mod: CPTII,S$GLB,, | Performed by: INTERNAL MEDICINE

## 2025-01-28 PROCEDURE — 1160F RVW MEDS BY RX/DR IN RCRD: CPT | Mod: CPTII,S$GLB,, | Performed by: INTERNAL MEDICINE

## 2025-01-28 PROCEDURE — 3078F DIAST BP <80 MM HG: CPT | Mod: CPTII,S$GLB,, | Performed by: INTERNAL MEDICINE

## 2025-01-28 PROCEDURE — 1159F MED LIST DOCD IN RCRD: CPT | Mod: CPTII,S$GLB,, | Performed by: INTERNAL MEDICINE

## 2025-01-28 PROCEDURE — 3008F BODY MASS INDEX DOCD: CPT | Mod: CPTII,S$GLB,, | Performed by: INTERNAL MEDICINE

## 2025-01-28 PROCEDURE — 99999 PR PBB SHADOW E&M-EST. PATIENT-LVL III: CPT | Mod: PBBFAC,,, | Performed by: INTERNAL MEDICINE

## 2025-01-28 PROCEDURE — 99214 OFFICE O/P EST MOD 30 MIN: CPT | Mod: S$GLB,,, | Performed by: INTERNAL MEDICINE

## 2025-01-28 PROCEDURE — 3074F SYST BP LT 130 MM HG: CPT | Mod: CPTII,S$GLB,, | Performed by: INTERNAL MEDICINE

## 2025-01-28 RX ORDER — FAMCICLOVIR 500 MG/1
500 TABLET ORAL 3 TIMES DAILY
Qty: 21 TABLET | Refills: 0 | Status: SHIPPED | OUTPATIENT
Start: 2025-01-28 | End: 2025-02-04

## 2025-01-28 RX ORDER — DEXAMETHASONE 4 MG/1
4 TABLET ORAL EVERY 12 HOURS
Qty: 20 TABLET | Refills: 0 | Status: SHIPPED | OUTPATIENT
Start: 2025-01-28 | End: 2025-02-07

## 2025-01-28 RX ORDER — HYDROCODONE BITARTRATE AND ACETAMINOPHEN 5; 325 MG/1; MG/1
1 TABLET ORAL EVERY 8 HOURS PRN
Qty: 20 TABLET | Refills: 0 | Status: SHIPPED | OUTPATIENT
Start: 2025-01-28

## 2025-01-28 RX ORDER — SENNOSIDES 25 MG/1
TABLET, FILM COATED ORAL
Qty: 45 G | Refills: 2 | Status: SHIPPED | OUTPATIENT
Start: 2025-01-28

## 2025-01-28 NOTE — PROGRESS NOTES
Subjective:       Patient ID: Sharifa Field is a 64 y.o. female.    Chief Complaint: Annual Exam and Medication Refill    HPI  History of Present Illness    CHIEF COMPLAINT:  Sharifa presents today with facial pain and discomfort from shingles on the rt side face not in the eyes skin lesions resolved but pain persist can't sleep at night     HISTORY OF PRESENT ILLNESS:  She initially sought care at urgent care followed by an emergency room visit. She was prescribed a 7-day course of antiviral medication during which she developed a rash. After discontinuing the antiviral, she experienced increased pain. She reports unilateral pain in her cheek, eye, and forehead with constant facial itching. She previously experienced stabbing head pains which have since resolved. The symptoms interfere with her sleep. She uses ice packs for comfort. She was prescribed hydrocodone which she took for a few days when pain was severe. She has attempted using aloe vera lotion on the affected area but reports it causes a burning sensation.      ROS:  General: -fever, -chills, -fatigue, -weight gain, -weight loss  Eyes: -vision changes, -redness, -discharge, +eye pain  ENT: -ear pain, -nasal congestion, -sore throat  Cardiovascular: -chest pain, -palpitations, -lower extremity edema  Respiratory: -cough, -shortness of breath  Gastrointestinal: -abdominal pain, -nausea, -vomiting, -diarrhea, -constipation, -blood in stool  Genitourinary: -dysuria, -hematuria, -frequency  Musculoskeletal: -joint pain, -muscle pain  Skin: +rash, -lesion  Neurological: -headache, -dizziness, -numbness, -tingling  Psychiatric: -anxiety, -depression, -sleep difficulty  Head: +head pain       Review of Systems    Objective:      Physical Exam  Physical Exam    General: No acute distress. Well-developed. Well-nourished.  Eyes: EOMI. Sclerae anicteric.  HENT: Normocephalic. Atraumatic. Nares patent. Moist oral mucosa.  Ears: Bilateral TMs clear. Bilateral  EACs clear.  Cardiovascular: Regular rate. Regular rhythm. No murmurs. No rubs. No gallops. Normal S1, S2.  Respiratory: Normal respiratory effort. Clear to auscultation bilaterally. No rales. No rhonchi. No wheezing.  Abdomen: Soft. Non-tender. Non-distended. Normoactive bowel sounds.  Musculoskeletal: No  obvious deformity.  Extremities: No lower extremity edema.  Neurological: Alert & oriented x3. No slurred speech. Normal gait.  Psychiatric: Normal mood. Normal affect. Good insight. Good judgment.  Skin: Warm. Mild patchy erythema rt cheeck forehead          Assessment:       1. Post-herpetic trigeminal neuralgia    2. Screening for diabetes mellitus    3. Encounter for long-term current use of medication        Plan:       Post-herpetic trigeminal neuralgia  Comments:  herpes zoster vaccine in couple months consider neuro consult if not better  Orders:  -     dexAMETHasone (DECADRON) 4 MG Tab; Take 1 tablet (4 mg total) by mouth every 12 (twelve) hours. for 10 days  Dispense: 20 tablet; Refill: 0  -     HYDROcodone-acetaminophen (NORCO) 5-325 mg per tablet; Take 1 tablet by mouth every 8 (eight) hours as needed for Pain.  Dispense: 20 tablet; Refill: 0  -     LIDOcaine 5 % Crea; Apply topical qid prn pain  Dispense: 45 g; Refill: 2  -     famciclovir (FAMVIR) 500 MG tablet; Take 1 tablet (500 mg total) by mouth 3 (three) times daily. for 7 days  Dispense: 21 tablet; Refill: 0    Screening for diabetes mellitus  -     Hemoglobin A1C; Future; Expected date: 01/28/2025    Encounter for long-term current use of medication  -     Hemoglobin A1C; Future; Expected date: 01/28/2025      Assessment & Plan    IMPRESSION:  - Assessed facial symptoms as likely shingles with postherpetic neuralgia  - Evaluated need for stronger pain management due to inadequacy of previous treatments  - Considered nerve block as potential future intervention if conservative measures fail    SHINGLES AND POSTHERPETIC NEURALGIA:  - Evaluated  the patient's condition, confirming shingles and postherpetic neuralgia as a complication.  - Noted the patient's reports of burning pain in cheek, eye, and forehead on one side of the face, along with constant itching and difficulty sleeping.  - Discussed postherpetic neuralgia as a potential complication of shingles.  - Prescribed a short course of steroids to reduce inflammation.  - Prescribed lidocaine cream for topical pain relief.  - Restarted valacyclovir 3 times daily for 7 days.  - Noted the patient's report of experiencing severe stabbing pains in the top of the head, which have since subsided.  - Evaluated the improvement of stabbing headache symptoms, while acknowledging persistent facial pain.  - Explained that shingles typically does not affect the brain, only external nerves.  - Reviewed the effectiveness of previously prescribed hydrocodone for pain management.  - Assessed the patient's ongoing pain and discomfort in the face, noting its impact on daily activities and emotional well-being.  - Acknowledged the chronic nature of the pain and its effect on the patient's quality of life.  - Increased pain medication dosage to address persistent symptoms.  - Planned to continue with current pain management strategies.  - Considered more aggressive pain management if current treatments prove ineffective.    SHINGLES VACCINATION:  - Informed about shingles vaccine as a preventive measure for future outbreaks.    NEUROLOGY REFERRAL:  - Considered referral to a neurologist if current treatment plan is ineffective.    FOLLOW UP:  - Instructed the patient to report if symptoms persist or worsen despite the prescribed treatment.  - Instructed the patient to monitor and report any changes in pain levels or new symptoms.           Medication List with Changes/Refills   New Medications    DEXAMETHASONE (DECADRON) 4 MG TAB    Take 1 tablet (4 mg total) by mouth every 12 (twelve) hours. for 10 days    FAMCICLOVIR  (FAMVIR) 500 MG TABLET    Take 1 tablet (500 mg total) by mouth 3 (three) times daily. for 7 days    HYDROCODONE-ACETAMINOPHEN (NORCO) 5-325 MG PER TABLET    Take 1 tablet by mouth every 8 (eight) hours as needed for Pain.    LIDOCAINE 5 % CREA    Apply topical qid prn pain   Current Medications    ASCORBIC ACID, VITAMIN C, (VITAMIN C) 1000 MG TABLET    Take 1,000 mg by mouth 2 (two) times daily.    ERYTHROMYCIN (ROMYCIN) OPHTHALMIC OINTMENT    SMARTSIG:sparingly Right Eye 3 Times Daily    FLUOROMETHOLONE 0.1% (FML) 0.1 % DRPS    Place 1 drop into the left eye 3 (three) times daily.    MULTIVIT-MINERALS/FOLIC ACID (CENTRUM MULTIGUMMIES ORAL)    Take 1 capsule by mouth once daily.   Discontinued Medications    GABAPENTIN (NEURONTIN) 300 MG CAPSULE    Take 1 capsule (300 mg total) by mouth 3 (three) times daily. for 14 days        This note was generated with the assistance of ambient listening technology. Verbal consent was obtained by the patient and accompanying visitor(s) for the recording of patient appointment to facilitate this note. I attest to having reviewed and edited the generated note for accuracy, though some syntax or spelling errors may persist. Please contact the author of this note for any clarification.

## 2025-01-30 ENCOUNTER — OFFICE VISIT (OUTPATIENT)
Dept: PAIN MEDICINE | Facility: CLINIC | Age: 65
End: 2025-01-30
Payer: MEDICARE

## 2025-01-30 VITALS
DIASTOLIC BLOOD PRESSURE: 81 MMHG | BODY MASS INDEX: 27.92 KG/M2 | RESPIRATION RATE: 18 BRPM | HEART RATE: 83 BPM | WEIGHT: 188.5 LBS | SYSTOLIC BLOOD PRESSURE: 129 MMHG | HEIGHT: 69 IN

## 2025-01-30 DIAGNOSIS — B02.22 HZV (HERPES ZOSTER VIRUS) POST HERPETIC TRIGEMINAL NEURALGIA: ICD-10-CM

## 2025-01-30 PROCEDURE — 3079F DIAST BP 80-89 MM HG: CPT | Mod: CPTII,S$GLB,, | Performed by: ANESTHESIOLOGY

## 2025-01-30 PROCEDURE — 1159F MED LIST DOCD IN RCRD: CPT | Mod: CPTII,S$GLB,, | Performed by: ANESTHESIOLOGY

## 2025-01-30 PROCEDURE — 3288F FALL RISK ASSESSMENT DOCD: CPT | Mod: CPTII,S$GLB,, | Performed by: ANESTHESIOLOGY

## 2025-01-30 PROCEDURE — 99999 PR PBB SHADOW E&M-EST. PATIENT-LVL III: CPT | Mod: PBBFAC,,, | Performed by: ANESTHESIOLOGY

## 2025-01-30 PROCEDURE — 1101F PT FALLS ASSESS-DOCD LE1/YR: CPT | Mod: CPTII,S$GLB,, | Performed by: ANESTHESIOLOGY

## 2025-01-30 PROCEDURE — 3008F BODY MASS INDEX DOCD: CPT | Mod: CPTII,S$GLB,, | Performed by: ANESTHESIOLOGY

## 2025-01-30 PROCEDURE — 3074F SYST BP LT 130 MM HG: CPT | Mod: CPTII,S$GLB,, | Performed by: ANESTHESIOLOGY

## 2025-01-30 PROCEDURE — 1125F AMNT PAIN NOTED PAIN PRSNT: CPT | Mod: CPTII,S$GLB,, | Performed by: ANESTHESIOLOGY

## 2025-01-30 PROCEDURE — 99204 OFFICE O/P NEW MOD 45 MIN: CPT | Mod: S$GLB,,, | Performed by: ANESTHESIOLOGY

## 2025-01-30 RX ORDER — PREGABALIN 50 MG/1
50 CAPSULE ORAL 3 TIMES DAILY
Qty: 90 CAPSULE | Refills: 1 | Status: SHIPPED | OUTPATIENT
Start: 2025-01-30

## 2025-01-30 NOTE — PROGRESS NOTES
PCP: Kofi Vieira MD    REFERRING PHYSICIAN: Julian Hollins MD    CHIEF COMPLAINT: Top of head, right forehead, and right cheek    Original HISTORY OF PRESENT ILLNESS: Sharifa Field presents to the clinic for the evaluation of the above pain. The pain started in January 2025 after you were diagnosed with shingles.     Original Pain Description:  The pain is located in the top of the head and right forehead and right cheek. The pain is described as burning and tingling. Exacerbating factors: sleeping. Mitigating factors medications. Symptoms interfere with sleeping. The patient feels like symptoms have been improving.    Original PAIN SCORES:  Best: Pain is 0  Worst: Pain is 7  Current: Pain is 0         No data to display                INTERVAL HISTORY: (Newest visit at the bottom)   Interval History (Date):       6 weeks of Conservative therapy:  PT: NA (Must include dates)  Chiro:  HEP:       Treatments / Medications: (Ice/Heat/NSAIDS/APAP/etc):  Hydrocodone   Gabapentin 300 TID      Interventional Pain Procedures: (Previous injections)  none    Past Medical History:   Diagnosis Date    GERD (gastroesophageal reflux disease)     H/O pheochromocytoma     Hyperlipidemia      Past Surgical History:   Procedure Laterality Date    ABDOMINAL ADHESION SURGERY      APPENDECTOMY N/A 2/1/2017    Procedure: APPENDECTOMY;  Surgeon: Mando Mccarthy MD;  Location: [facility] OR [address] [place] [address];  Service: General;  Laterality: N/A;    CERVICAL CONIZATION   W/ LASER      COLONOSCOPY N/A 8/1/2016    Procedure: COLONOSCOPY;  Surgeon: MIRNA Hawk MD;  Location: [facility] (4TH FLR);  Service: Endoscopy;  Laterality: N/A;    TUMOR REMOVAL  2004     Social History     Socioeconomic History    Marital status:    Occupational History     Employer: [employer]   Tobacco Use    Smoking status: Never    Smokeless tobacco: Never   Substance and Sexual Activity    Alcohol use: Yes     Alcohol/week: 0.0 standard  drinks of alcohol     Comment: occasionally     Drug use: No    Sexual activity: Yes     Partners: Male   Social History Narrative    Lives w/.     Social Drivers of Health     Financial Resource Strain: Low Risk  (1/23/2025)    Overall Financial Resource Strain (CARDIA)     Difficulty of Paying Living Expenses: Not hard at all   Food Insecurity: No Food Insecurity (1/23/2025)    Hunger Vital Sign     Worried About Running Out of Food in the Last Year: Never true     Ran Out of Food in the Last Year: Never true   Transportation Needs: No Transportation Needs (1/23/2025)    TRANSPORTATION NEEDS     Transportation : No   Stress: No Stress Concern Present (6/20/2019)    [employer] [place] [employer] - Occupational Stress Questionnaire     Feeling of Stress : Not at all   Housing Stability: Low Risk  (1/23/2025)    Housing Stability Vital Sign     Unable to Pay for Housing in the Last Year: No     Homeless in the Last Year: No     Family History   Problem Relation Name Age of Onset    Diabetes Mother      Stroke Mother      Heart disease Father      Hypertension Father      Heart attack Father      Cancer Maternal Aunt      Breast cancer Maternal Aunt      Breast cancer Cousin      Colon cancer Neg Hx      Ovarian cancer Neg Hx         Review of patient's allergies indicates:   Allergen Reactions    Shellfish containing products Rash     Likely shellfish allergy after cleaning and eating shrimp awoke with red itching rash whole body     Bactrim [sulfamethoxazole-trimethoprim] Rash       Current Outpatient Medications   Medication Sig    ascorbic acid, vitamin C, (VITAMIN C) 1000 MG tablet Take 1,000 mg by mouth 2 (two) times daily.    dexAMETHasone (DECADRON) 4 MG Tab Take 1 tablet (4 mg total) by mouth every 12 (twelve) hours. for 10 days    erythromycin (ROMYCIN) ophthalmic ointment SMARTSIG:sparingly Right Eye 3 Times Daily    famciclovir (FAMVIR) 500 MG tablet Take 1 tablet (500 mg total) by mouth  "3 (three) times daily. for 7 days    fluorometholone 0.1% (FML) 0.1 % DrpS Place 1 drop into the left eye 3 (three) times daily.    HYDROcodone-acetaminophen (NORCO) 5-325 mg per tablet Take 1 tablet by mouth every 8 (eight) hours as needed for Pain.    LIDOcaine 5 % Crea Apply topical qid prn pain    multivit-minerals/folic acid (CENTRUM MULTIGUMMIES ORAL) Take 1 capsule by mouth once daily.     No current facility-administered medications for this visit.       ROS:  GENERAL: No fever. No chills. No fatigue. Denies weight loss. Denies weight gain.  HEENT: Denies headaches. Denies vision change. Denies eye pain. Denies double vision. Denies ear pain.   CV: Denies chest pain.   PULM: Denies of shortness of breath.  GI: Denies constipation. No diarrhea. No abdominal pain. Denies nausea. Denies vomiting. No blood in stool.  HEME: Denies bleeding problems.  : Denies urgency. No painful urination. No blood in urine.  MS: Denies joint stiffness. Denies joint swelling.  Denies back pain.  SKIN: Denies rash.   NEURO: Denies seizures. No weakness.  PSYCH:  Denies difficulty sleeping. No anxiety. Denies depression. No suicidal thoughts.       VITALS:   Vitals:    01/30/25 0909   BP: 129/81   Pulse: 83   Resp: 18   Weight: 85.5 kg (188 lb 7.9 oz)   Height: 5' 9" (1.753 m)   PainSc:   7   PainLoc: Head         PHYSICAL EXAM:   GENERAL: Well appearing, in no acute distress, alert and oriented x3.  PSYCH:  Mood and affect appropriate.  SKIN: Skin color, texture, turgor normal, no rashes or lesions.  HEENT:  Normocephalic, atraumatic. Cranial nerves intact. Decreased sensation in the right V1 distribution.   NECK: No pain to palpation over the cervical paraspinous muscles. No pain to palpation over facets. No pain with neck flexion, extension, or lateral flexion.   PULM: No evidence of respiratory difficulty, symmetric chest rise.  GI:  Non-distended  BACK: Normal range of motion.   EXTREMITIES: No deformities, edema, or skin " discoloration.   NEURO: Sensation is equal and appropriate bilaterally. Bilateral upper and lower extremity strength is normal and symmetric. Bilateral upper and lower extremity coordination and muscle stretch reflexes are physiologic and symmetric. Plantar response are downgoing. Straight leg raising in the supine position is negative to radicular pain.   GAIT: normal.      LABS:      IMAGING:        ASSESSMENT: 64 y.o. year old female with pain, consistent with:    Encounter Diagnosis   Name Primary?    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 She comes to us with right forehead pain that started after a bout of shingles which is worst with trying to sleep. Exam shows decreased sensation in the right V1 distribution. She is improving but continues to take Hydrocodone.     OPIOID MANAGEMENT:  MME: 10  Risk:   : Reviewed today  Naloxone:   Utox:   Violations: None  Contract:      PLAN:  Start Lyrica 50 TID. Ramp instructions provided  Proceed with steroids and antivirals already prescribed  Recommend Lidocaine ointment as a rescue  Recommend against continued Hydrocodone if possible  Will follow up in 4 weeks to adjust Lyrica      I would like to thank Julian Hollins MD for the opportunity to assist in the care of this patient. We had a very nice visit and I look forward to continuing their care. Please let me know if I can be of further assistance.     Amanda Leyva  01/30/2025

## 2025-02-06 ENCOUNTER — CLINICAL SUPPORT (OUTPATIENT)
Dept: REHABILITATION | Facility: HOSPITAL | Age: 65
End: 2025-02-06
Payer: MEDICARE

## 2025-02-06 DIAGNOSIS — M25.60 DECREASED MOBILITY OF JOINT: Primary | ICD-10-CM

## 2025-02-06 DIAGNOSIS — M62.81 MUSCLE WEAKNESS OF LOWER EXTREMITY: ICD-10-CM

## 2025-02-06 DIAGNOSIS — R29.3 POSTURAL IMBALANCE: ICD-10-CM

## 2025-02-06 DIAGNOSIS — M62.81 QUADRICEPS WEAKNESS: ICD-10-CM

## 2025-02-06 DIAGNOSIS — B02.22 HZV (HERPES ZOSTER VIRUS) POST HERPETIC TRIGEMINAL NEURALGIA: ICD-10-CM

## 2025-02-06 PROCEDURE — 97161 PT EVAL LOW COMPLEX 20 MIN: CPT | Performed by: PHYSICAL THERAPIST

## 2025-02-06 NOTE — PROGRESS NOTES
Outpatient Rehab    Physical Therapy Evaluation    Patient Name: Sharifa Field  MRN: [id number]  YOB: 1960  Today's Date: 2/19/2025    Therapy Diagnosis:   Encounter Diagnoses   Name Primary?    HZV (herpes zoster virus) post herpetic trigeminal neuralgia     Decreased mobility of joint Yes    Muscle weakness of lower extremity     Postural imbalance     Quadriceps weakness      Physician: Julian Hollins MD    Physician Orders: Eval and Treat  Medical Diagnosis: B02.22 (ICD-10-CM) - HZV (herpes zoster virus) post herpetic trigeminal neuralgia   Visit # / Visits Authorized:  1 / 1   Date of Evaluation:  2/6/2025   Insurance Authorization Period: 1/19/2025 to 1/19/2026  Plan of Care Certification:  2/6/2025 to 3/6/2025     Time In:   9:00 am  Time Out:   9:40 am  Total Time:   40 minutes  Total Billable Time: 40 minutes     Subjective   History of Present Illness  Sharifa is a 64 y.o. female who reports to physical therapy with a chief concern of Right-Sided Facial Pain. According to the patient's chart, Sharifa has a past medical history of GERD (gastroesophageal reflux disease), H/O pheochromocytoma, and Hyperlipidemia. Sharifa has a past surgical history that includes Tumor removal (2004); Abdominal adhesion surgery; Cervical conization w/ laser; Colonoscopy (N/A, 8/1/2016); and Appendectomy (N/A, 2/1/2017).                History of Present Condition/Illness: January 2, 2024; Chelle is a 64 year old female presenting for post-herpetic shingles infection that began on January 2. She endorses that she doesn't really know why she is here. She went to urgent care feeling malaise and was diagnosed with shingles and was given valacyclovir and gabapentin for nerve pain. After 7 days, she began feeling a stabbing pain in her head and burning in the right side of the face. 1000 mg of Gabapentin was provided but due to high dosage, she stopped taking this medication. She reports that now the rash is  "going away and the pain is "not that bad." She endorses stress-related headaches. She notes that there is no facial drooping. She reports that she is often very stressed out with family matters, and has no support system or means of coping with therapy.    Pain     Patient reports a current pain level of 4/10. Pain at best is reported as 0/10. Pain at worst is reported as 4/10.   Location: Right Facial Pain  Clinical Progression (since onset): Stable  Pain Qualities: Burning, Other (Comment)  Pain-Relieving Factors: Medications - prescription, Ice           Past Medical History/Physical Systems Review:   Sharifa Field  has a past medical history of GERD (gastroesophageal reflux disease), H/O pheochromocytoma, and Hyperlipidemia.    Sharifa Field  has a past surgical history that includes Tumor removal (2004); Abdominal adhesion surgery; Cervical conization w/ laser; Colonoscopy (N/A, 8/1/2016); and Appendectomy (N/A, 2/1/2017).    Sharifa has a current medication list which includes the following prescription(s): ascorbic acid (vitamin c), erythromycin, fluorometholone 0.1%, hydrocodone-acetaminophen, lidocaine, multivit-minerals/folic acid, and pregabalin.    Review of patient's allergies indicates:   Allergen Reactions    Shellfish containing products Rash     Likely shellfish allergy after cleaning and eating shrimp awoke with red itching rash whole body     Bactrim [sulfamethoxazole-trimethoprim] Rash        Objective      Cervical Thoracic Sensation  Right Cervical/Thoracic Sensation  Intact: Dynamic Two Point Discrimination       Left Cervical/Thoracic Sensation  Intact: Dynamic Two Point Discrimination       Trigeminal Nerve Sensation: Intact and Symmetrical, all branches       Facial Nerve Function: Patient is able to raise eyebrows symmetrically, squint, expand cheeks, smile, and move tongue symmetrically.    Intake Outcome Measure for FOTO Survey    Therapist reviewed FOTO scores for " Sharifa Barbara Mitchellon on 2/6/2025.   FOTO report - see Media section or FOTO account episode details.     Intake Score:  %    Treatment:  Paper handout was provided to the patient for desensitization of the nervous system and facial nervous system.    Patient's spiritual, cultural, and educational needs considered and patient agreeable to plan of care and goals.     Assessment & Plan        Goals: None to be made as patient will not be appropriate for therapy at this time.    Anna Chow, PT DPT  Board Certified in Orthopedic Physical Therapy

## 2025-02-26 ENCOUNTER — OFFICE VISIT (OUTPATIENT)
Dept: URGENT CARE | Facility: CLINIC | Age: 65
End: 2025-02-26
Payer: MEDICARE

## 2025-02-26 VITALS
WEIGHT: 178 LBS | RESPIRATION RATE: 16 BRPM | SYSTOLIC BLOOD PRESSURE: 122 MMHG | DIASTOLIC BLOOD PRESSURE: 80 MMHG | HEART RATE: 77 BPM | OXYGEN SATURATION: 98 % | HEIGHT: 69 IN | TEMPERATURE: 99 F | BODY MASS INDEX: 26.36 KG/M2

## 2025-02-26 DIAGNOSIS — H65.193 ACUTE EFFUSION OF BOTH MIDDLE EARS: ICD-10-CM

## 2025-02-26 DIAGNOSIS — J10.1 INFLUENZA A: Primary | ICD-10-CM

## 2025-02-26 DIAGNOSIS — H66.91 OTITIS OF RIGHT EAR: ICD-10-CM

## 2025-02-26 LAB
CTP QC/QA: YES
CTP QC/QA: YES
POC MOLECULAR INFLUENZA A AGN: POSITIVE
POC MOLECULAR INFLUENZA B AGN: NEGATIVE
SARS CORONAVIRUS 2 ANTIGEN: NEGATIVE

## 2025-02-26 PROCEDURE — 87502 INFLUENZA DNA AMP PROBE: CPT | Mod: QW,S$GLB,, | Performed by: PHYSICIAN ASSISTANT

## 2025-02-26 PROCEDURE — 87811 SARS-COV-2 COVID19 W/OPTIC: CPT | Mod: QW,S$GLB,, | Performed by: PHYSICIAN ASSISTANT

## 2025-02-26 PROCEDURE — 99213 OFFICE O/P EST LOW 20 MIN: CPT | Mod: S$GLB,,, | Performed by: PHYSICIAN ASSISTANT

## 2025-02-26 RX ORDER — FLUTICASONE PROPIONATE 50 MCG
1 SPRAY, SUSPENSION (ML) NASAL DAILY
Qty: 16 G | Refills: 0 | Status: SHIPPED | OUTPATIENT
Start: 2025-02-26 | End: 2025-03-05

## 2025-02-26 RX ORDER — CETIRIZINE HYDROCHLORIDE 10 MG/1
10 TABLET ORAL DAILY
Qty: 7 TABLET | Refills: 0 | Status: SHIPPED | OUTPATIENT
Start: 2025-02-26 | End: 2025-03-05

## 2025-02-26 RX ORDER — ATORVASTATIN CALCIUM 20 MG/1
20 TABLET, FILM COATED ORAL NIGHTLY
COMMUNITY
Start: 2025-02-09

## 2025-02-26 RX ORDER — BENZONATATE 200 MG/1
200 CAPSULE ORAL 3 TIMES DAILY PRN
Qty: 30 CAPSULE | Refills: 0 | Status: SHIPPED | OUTPATIENT
Start: 2025-02-26 | End: 2025-03-08

## 2025-02-26 RX ORDER — AMOXICILLIN 500 MG/1
500 TABLET, FILM COATED ORAL EVERY 12 HOURS
Qty: 20 TABLET | Refills: 0 | Status: SHIPPED | OUTPATIENT
Start: 2025-02-26 | End: 2025-03-08

## 2025-02-26 RX ORDER — PROMETHAZINE HYDROCHLORIDE AND DEXTROMETHORPHAN HYDROBROMIDE 6.25; 15 MG/5ML; MG/5ML
5 SYRUP ORAL NIGHTLY PRN
Qty: 50 ML | Refills: 0 | Status: SHIPPED | OUTPATIENT
Start: 2025-02-26 | End: 2025-03-08

## 2025-02-26 NOTE — PATIENT INSTRUCTIONS
Tested positive for Influenza A today.  As we discussed you are out of the window for flu medication.Use Tessalon Perles as needed for cough during the day.  Use cough syrup at night this can be sedating please do not drink alcohol or drive with this medication  Use Flonase for postnasal drip.  Use Zyrtec as directed for fluid behind the ears.  You also have an ear infection take the antibiotic as prescribed.  This will help with the ear infection but not the rest your symptoms as we discussed.  If the ear does not get better in 2 or 3 days please return for evaluation    Take Tylenol/ibuprofen with food for fevers/pain. Drink plenty of fluids and get plenty of rest. Do not return to school/work until 24 hour fever free without the use of tylenol/ibuprofen. Peptobismol  as needed if you develop any diarrhea, not immodium.  This can turn your stool and tongue black and will go away after discontinuing the Pepto-Bismol.  If your symptoms worsen please return or go to the ER for further evaluation.

## 2025-02-26 NOTE — PROGRESS NOTES
"Subjective:      Patient ID: Sharifa Field is a 64 y.o. female.    Vitals:  height is 5' 9" (1.753 m) and weight is 80.7 kg (178 lb). Her oral temperature is 98.5 °F (36.9 °C). Her blood pressure is 122/80 and her pulse is 77. Her respiration is 16 and oxygen saturation is 98%.     Chief Complaint: Cough    64 y.o female c/o sinus congestion and ear congestion started 6-7 days ago. Patient states that she has been doing a lot of coughing.    Cough  This is a new problem. The current episode started in the past 7 days. The problem has been gradually worsening. The problem occurs constantly. Associated symptoms include ear congestion, ear pain, headaches, nasal congestion and postnasal drip. Pertinent negatives include no chest pain, chills, fever, heartburn, hemoptysis, myalgias, rash, rhinorrhea, sore throat, shortness of breath, weight loss or wheezing. She has tried OTC cough suppressant for the symptoms. The treatment provided no relief. There is no history of asthma, bronchiectasis, bronchitis, COPD, emphysema, environmental allergies or pneumonia.       Constitution: Positive for appetite change and fatigue. Negative for chills, sweating and fever.   HENT:  Positive for ear pain and postnasal drip. Negative for ear discharge, tinnitus, hearing loss, dental problem, drooling, mouth sores, tongue pain, tongue lesion, congestion, sinus pain, sinus pressure, sore throat, trouble swallowing and voice change.    Neck: Negative for neck pain and neck stiffness.   Cardiovascular:  Negative for chest pain.   Eyes:  Negative for eye discharge and eye itching.   Respiratory:  Positive for cough and sputum production. Negative for chest tightness, bloody sputum, shortness of breath and wheezing.    Gastrointestinal:  Negative for abdominal pain, nausea, vomiting, constipation, diarrhea and heartburn.   Musculoskeletal:  Negative for muscle ache.   Skin:  Negative for rash.   Allergic/Immunologic: Negative for " environmental allergies.   Neurological:  Positive for headaches. Negative for dizziness.      Past Medical History:   Diagnosis Date    GERD (gastroesophageal reflux disease)     H/O pheochromocytoma     Hyperlipidemia        Past Surgical History:   Procedure Laterality Date    ABDOMINAL ADHESION SURGERY      APPENDECTOMY N/A 2/1/2017    Procedure: APPENDECTOMY;  Surgeon: Mando Mccarthy MD;  Location: [facility] OR 2ND FLR;  Service: General;  Laterality: N/A;    CERVICAL CONIZATION   W/ LASER      COLONOSCOPY N/A 8/1/2016    Procedure: COLONOSCOPY;  Surgeon: MIRNA Hawk MD;  Location: [facility] ENDO (4TH FLR);  Service: Endoscopy;  Laterality: N/A;    TUMOR REMOVAL  2004       Family History   Problem Relation Name Age of Onset    Diabetes Mother      Stroke Mother      Heart disease Father      Hypertension Father      Heart attack Father      Cancer Maternal Aunt      Breast cancer Maternal Aunt      Breast cancer Cousin      Colon cancer Neg Hx      Ovarian cancer Neg Hx         Social History     Socioeconomic History    Marital status:    Occupational History     Employer: [employer]   Tobacco Use    Smoking status: Never    Smokeless tobacco: Never   Substance and Sexual Activity    Alcohol use: Yes     Alcohol/week: 0.0 standard drinks of alcohol     Comment: occasionally     Drug use: No    Sexual activity: Yes     Partners: Male   Social History Narrative    Lives w/.     Social Drivers of Health     Financial Resource Strain: Low Risk  (1/23/2025)    Overall Financial Resource Strain (CARDIA)     Difficulty of Paying Living Expenses: Not hard at all   Food Insecurity: No Food Insecurity (1/23/2025)    Hunger Vital Sign     Worried About Running Out of Food in the Last Year: Never true     Ran Out of Food in the Last Year: Never true   Transportation Needs: No Transportation Needs (1/23/2025)    TRANSPORTATION NEEDS     Transportation : No   Stress: No Stress Concern Present  (6/20/2019)    [employer] - Occupational Stress Questionnaire     Feeling of Stress : Not at all   Housing Stability: Unknown (1/23/2025)    Housing Stability Vital Sign     Unable to Pay for Housing in the Last Year: No     Homeless in the Last Year: No       Current Medications[1]    Review of patient's allergies indicates:   Allergen Reactions    Shellfish containing products Rash     Likely shellfish allergy after cleaning and eating shrimp awoke with red itching rash whole body     Bactrim [sulfamethoxazole-trimethoprim] Rash       Objective:     Physical Exam   Constitutional:  Non-toxic appearance. She does not appear ill. No distress.   HENT:   Head: Normocephalic and atraumatic.   Ears:   Right Ear: External ear and ear canal normal. Tympanic membrane is erythematous and bulging. Tympanic membrane is not injected and not retracted. A middle ear effusion is present. no impacted cerumen  Left Ear: External ear and ear canal normal. Tympanic membrane is not injected, not erythematous, not retracted and not bulging. A middle ear effusion is present. no impacted cerumen  Nose: Rhinorrhea present. No congestion. Right sinus exhibits no maxillary sinus tenderness and no frontal sinus tenderness. Left sinus exhibits no maxillary sinus tenderness and no frontal sinus tenderness.   Mouth/Throat: Mucous membranes are moist. Posterior oropharyngeal erythema present. No oropharyngeal exudate. Tonsils are 1+ on the right. Tonsils are 1+ on the left. No tonsillar exudate.   Eyes: Conjunctivae are normal. Right eye exhibits no discharge. Left eye exhibits no discharge.   Neck: Neck supple.   Cardiovascular: Normal rate, regular rhythm and normal heart sounds.   No murmur heard.Exam reveals no gallop and no friction rub.   Pulmonary/Chest: Effort normal and breath sounds normal. No stridor. No respiratory distress. She has no wheezes. She has no rhonchi. She has no rales.   Abdominal: Normal  appearance.   Musculoskeletal:      Cervical back: She exhibits no tenderness.   Lymphadenopathy:     She has no cervical adenopathy.   Neurological: She is alert.   Skin: Skin is warm, dry, not diaphoretic and no rash.   Psychiatric: Her behavior is normal. Mood normal.   Nursing note and vitals reviewed.    Results for orders placed or performed in visit on 02/26/25   SARS Coronavirus 2 Antigen, POCT Manual Read    Collection Time: 02/26/25  3:09 PM   Result Value Ref Range    SARS Coronavirus 2 Antigen Negative Negative, Presumptive Negative     Acceptable Yes    POCT Influenza A/B MOLECULAR    Collection Time: 02/26/25  3:09 PM   Result Value Ref Range    POC Molecular Influenza A Ag Positive (A) Negative    POC Molecular Influenza B Ag Negative Negative     Acceptable Yes          Assessment:     1. Influenza A    2. Acute effusion of both middle ears    3. Otitis of right ear        Plan:       Influenza A  -     SARS Coronavirus 2 Antigen, POCT Manual Read  -     POCT Influenza A/B MOLECULAR  -     benzonatate (TESSALON) 200 MG capsule; Take 1 capsule (200 mg total) by mouth 3 (three) times daily as needed for Cough.  Dispense: 30 capsule; Refill: 0  -     promethazine-dextromethorphan (PROMETHAZINE-DM) 6.25-15 mg/5 mL Syrp; Take 5 mLs by mouth nightly as needed (cough).  Dispense: 50 mL; Refill: 0  -     fluticasone propionate (FLONASE) 50 mcg/actuation nasal spray; 1 spray (50 mcg total) by Each Nostril route once daily. for 7 days  Dispense: 16 g; Refill: 0  -     cetirizine (ZYRTEC) 10 MG tablet; Take 1 tablet (10 mg total) by mouth once daily. for 7 days  Dispense: 7 tablet; Refill: 0    Acute effusion of both middle ears  -     cetirizine (ZYRTEC) 10 MG tablet; Take 1 tablet (10 mg total) by mouth once daily. for 7 days  Dispense: 7 tablet; Refill: 0    Otitis of right ear  -     amoxicillin (AMOXIL) 500 MG Tab; Take 1 tablet (500 mg total) by mouth every 12 (twelve) hours.  for 10 days  Dispense: 20 tablet; Refill: 0        Results reviewed  I have reviewed the patient chart and pertinent past imaging/labs.          Patient Instructions   Tested positive for Influenza A today.  As we discussed you are out of the window for flu medication.Use Tessalon Perles as needed for cough during the day.  Use cough syrup at night this can be sedating please do not drink alcohol or drive with this medication  Use Flonase for postnasal drip.  Use Zyrtec as directed for fluid behind the ears.  You also have an ear infection take the antibiotic as prescribed.  This will help with the ear infection but not the rest your symptoms as we discussed.  If the ear does not get better in 2 or 3 days please return for evaluation    Take Tylenol/ibuprofen with food for fevers/pain. Drink plenty of fluids and get plenty of rest. Do not return to school/work until 24 hour fever free without the use of tylenol/ibuprofen. Peptobismol  as needed if you develop any diarrhea, not immodium.  This can turn your stool and tongue black and will go away after discontinuing the Pepto-Bismol.  If your symptoms worsen please return or go to the ER for further evaluation.                [1]   Current Outpatient Medications   Medication Sig Dispense Refill    multivit-minerals/folic acid (CENTRUM MULTIGUMMIES ORAL) Take 1 capsule by mouth once daily.      pregabalin (LYRICA) 50 MG capsule Take 1 capsule (50 mg total) by mouth 3 (three) times daily. 90 capsule 1    ascorbic acid, vitamin C, (VITAMIN C) 1000 MG tablet Take 1,000 mg by mouth 2 (two) times daily.      atorvastatin (LIPITOR) 20 MG tablet Take 20 mg by mouth every evening.      erythromycin (ROMYCIN) ophthalmic ointment SMARTSIG:sparingly Right Eye 3 Times Daily      fluorometholone 0.1% (FML) 0.1 % DrpS Place 1 drop into the left eye 3 (three) times daily.      LIDOcaine 5 % Crea Apply topical qid prn pain 45 g 2     No current facility-administered medications for  this visit.

## 2025-04-29 ENCOUNTER — RESULTS FOLLOW-UP (OUTPATIENT)
Dept: PRIMARY CARE CLINIC | Facility: CLINIC | Age: 65
End: 2025-04-29

## 2025-04-29 ENCOUNTER — OFFICE VISIT (OUTPATIENT)
Dept: PRIMARY CARE CLINIC | Facility: CLINIC | Age: 65
End: 2025-04-29
Payer: MEDICARE

## 2025-04-29 VITALS
SYSTOLIC BLOOD PRESSURE: 132 MMHG | HEART RATE: 63 BPM | WEIGHT: 184.75 LBS | HEIGHT: 69 IN | DIASTOLIC BLOOD PRESSURE: 76 MMHG | TEMPERATURE: 98 F | BODY MASS INDEX: 27.36 KG/M2 | RESPIRATION RATE: 16 BRPM | OXYGEN SATURATION: 99 %

## 2025-04-29 DIAGNOSIS — R79.9 ABNORMAL FINDING OF BLOOD CHEMISTRY, UNSPECIFIED: ICD-10-CM

## 2025-04-29 DIAGNOSIS — F41.9 ANXIETY: ICD-10-CM

## 2025-04-29 DIAGNOSIS — Z00.00 ANNUAL PHYSICAL EXAM: Primary | ICD-10-CM

## 2025-04-29 DIAGNOSIS — Z23 NEED FOR SHINGLES VACCINE: ICD-10-CM

## 2025-04-29 DIAGNOSIS — E55.9 VITAMIN D DEFICIENCY, UNSPECIFIED: Primary | ICD-10-CM

## 2025-04-29 DIAGNOSIS — Z23 NEED FOR PROPHYLACTIC VACCINATION WITH STREPTOCOCCUS PNEUMONIAE (PNEUMOCOCCUS) AND INFLUENZA VACCINES: ICD-10-CM

## 2025-04-29 DIAGNOSIS — M81.0 AGE-RELATED OSTEOPOROSIS WITHOUT CURRENT PATHOLOGICAL FRACTURE: ICD-10-CM

## 2025-04-29 PROCEDURE — 99999 PR PBB SHADOW E&M-EST. PATIENT-LVL IV: CPT | Mod: PBBFAC,,,

## 2025-04-29 PROCEDURE — 3008F BODY MASS INDEX DOCD: CPT | Mod: CPTII,S$GLB,,

## 2025-04-29 PROCEDURE — G0009 ADMIN PNEUMOCOCCAL VACCINE: HCPCS | Mod: S$GLB,,,

## 2025-04-29 PROCEDURE — 3078F DIAST BP <80 MM HG: CPT | Mod: CPTII,S$GLB,,

## 2025-04-29 PROCEDURE — 99213 OFFICE O/P EST LOW 20 MIN: CPT | Mod: S$GLB,,,

## 2025-04-29 PROCEDURE — 1125F AMNT PAIN NOTED PAIN PRSNT: CPT | Mod: CPTII,S$GLB,,

## 2025-04-29 PROCEDURE — 90732 PPSV23 VACC 2 YRS+ SUBQ/IM: CPT | Mod: S$GLB,,,

## 2025-04-29 PROCEDURE — 3288F FALL RISK ASSESSMENT DOCD: CPT | Mod: CPTII,S$GLB,,

## 2025-04-29 PROCEDURE — 1160F RVW MEDS BY RX/DR IN RCRD: CPT | Mod: CPTII,S$GLB,,

## 2025-04-29 PROCEDURE — 3075F SYST BP GE 130 - 139MM HG: CPT | Mod: CPTII,S$GLB,,

## 2025-04-29 PROCEDURE — 1159F MED LIST DOCD IN RCRD: CPT | Mod: CPTII,S$GLB,,

## 2025-04-29 PROCEDURE — 1101F PT FALLS ASSESS-DOCD LE1/YR: CPT | Mod: CPTII,S$GLB,,

## 2025-04-29 RX ORDER — ZOSTER VACCINE RECOMBINANT, ADJUVANTED 50 MCG/0.5
0.5 KIT INTRAMUSCULAR ONCE
Qty: 1 EACH | Refills: 0 | Status: SHIPPED | OUTPATIENT
Start: 2025-04-29 | End: 2025-04-29

## 2025-04-29 RX ORDER — ATORVASTATIN CALCIUM 40 MG/1
40 TABLET, FILM COATED ORAL DAILY
Qty: 90 TABLET | Refills: 3 | Status: SHIPPED | OUTPATIENT
Start: 2025-04-29 | End: 2026-04-29

## 2025-04-29 RX ORDER — GABAPENTIN 300 MG/1
300 CAPSULE ORAL 3 TIMES DAILY PRN
COMMUNITY
Start: 2025-03-15

## 2025-04-29 NOTE — PROGRESS NOTES
Assessment:       1. Annual physical exam    2. Abnormal finding of blood chemistry, unspecified    3. Anxiety    4. Age-related osteoporosis without current pathological fracture    5. Need for prophylactic vaccination with Streptococcus pneumoniae (Pneumococcus) and Influenza vaccines    6. Need for shingles vaccine       Plan:       Annual physical exam  -     CBC Auto Differential; Future; Expected date: 04/29/2025  -     Comprehensive Metabolic Panel; Future; Expected date: 04/29/2025  -     Lipid Panel; Future; Expected date: 04/29/2025  -     Hemoglobin A1C; Future; Expected date: 04/29/2025  -     TSH; Future; Expected date: 04/29/2025  -     Vitamin D; Future; Expected date: 04/29/2025  -     Calcium; Future; Expected date: 04/29/2025  -     atorvastatin (LIPITOR) 40 MG tablet; Take 1 tablet (40 mg total) by mouth once daily.  Dispense: 90 tablet; Refill: 3    Abnormal finding of blood chemistry, unspecified  -     CBC Auto Differential; Future; Expected date: 04/29/2025  -     Lipid Panel; Future; Expected date: 04/29/2025  -     Hemoglobin A1C; Future; Expected date: 04/29/2025  -     TSH; Future; Expected date: 04/29/2025    Anxiety  -     TSH; Future; Expected date: 04/29/2025    Age-related osteoporosis without current pathological fracture  -     Vitamin D; Future; Expected date: 04/29/2025    Need for prophylactic vaccination with Streptococcus pneumoniae (Pneumococcus) and Influenza vaccines  -     pneumococcal vaccine (PNEUMOVAX-23) injection 0.5 mL    Need for shingles vaccine  -     varicella-zoster [id number], PF, (SHINGRIX, PF,) 50 mcg/0.5 mL injection; Inject 0.5 mLs into the muscle once. for 1 dose  Dispense: 1 each; Refill: 0      Assessment & Plan    - Assessed measles immunity status given exposure risk from grandchildren.  - Reviewed cardiovascular risk and statin therapy, weighing benefits against potential side effects.  - Determined need for pneumococcal vaccination based on age  recommendations.    SHINGLES AND POST-HERPETIC NEURALGIA:  - Monitored the patient's ongoing pain from shingles, particularly during stressful times.  - Ordered shingles vaccine prescription to be sent to [facility] pharmacy on [address] for administration for ongoing post-herpetic neuralgia management.  - Instructed patient to follow up with pharmacy to confirm availability of vaccine before pickup.    HYPERLIPIDEMIA:  - Increased Atorvastatin dosage from 20 mg to 40 mg daily after discussing risks and benefits-this was patient's previous dose.  Patient reports she is taken to 20 mg tablets daily.   - Ordered lipid panel as part of labs to guide final decisions on medication dosage.  - Provided education on purpose of statin therapy for individuals over 50 and the relative risks and benefits for cardiovascular health.    GENERAL HEALTH SCREENING AND MANAGEMENT:  - Ordered comprehensive lab panel including CBC, CMP, thyroid panel, hemoglobin A1C, lipid panel, vitamin D level, and calcium level to assess overall health status.  - Ordered DEXA scan for osteoporosis screening due to age-related risk factors.  - Administered pneumococcal vaccine in office.  - Ordered measles antibody level check.    FOLLOW-UP:  - Contact office when lab results and DEXA scan results are available for review and further recommendations.       Subjective:    Patient ID: Sharifa Field is a 64 y.o. female.  Chief Complaint: Annual Exam    HPI  History of Present Illness    Ms. Field presents for an annual physical exam and to inquire about measles vaccination due to frequent contact with her grandchildren at school.    Ms. Field reports having had shingles and is still having associated pain. The pain worsens during periods of stress, particularly when caring for her three grandchildren, two of whom have autism. She mentions taking atorvastatin, initially at a dose of 40 mg, which was later reduced to 20 mg due to concerns  "raised by her sister. She expresses concern about the potential risk of stroke and indicates a desire to increase the dosage back to 40 mg.    She denies any swelling in her ankles.  Ms. Field is due for a DEXA scan, which is recommended every 3 years. She has not yet completed this scan.  She denies chest pain, shortness of breath, palpitations.     ROS:  Cardiovascular: -lower extremity edema  Neurological: +nerve pain       Review of Systems   Constitutional:  Negative for appetite change, fatigue, fever and unexpected weight change.   HENT:  Negative for hearing loss and sore throat.    Eyes:  Negative for pain and visual disturbance.   Respiratory:  Negative for cough, shortness of breath and wheezing.    Cardiovascular:  Negative for chest pain, palpitations and leg swelling.   Gastrointestinal:  Negative for abdominal pain, blood in stool, constipation, diarrhea, nausea and vomiting.   Genitourinary:  Negative for dysuria.   Musculoskeletal:  Negative for arthralgias.   Neurological:  Negative for dizziness and headaches.   Psychiatric/Behavioral:  Negative for suicidal ideas.        Objective:      Vitals:    04/29/25 1011   BP: 132/76   BP Location: Right arm   Patient Position: Sitting   Pulse: 63   Resp: 16   Temp: 97.5 °F (36.4 °C)   TempSrc: Oral   SpO2: 99%   Weight: 83.8 kg (184 lb 11.9 oz)   Height: 5' 9" (1.753 m)     BP Readings from Last 5 Encounters:   04/29/25 132/76   02/26/25 122/80   01/30/25 129/81   01/28/25 117/73   01/19/25 (!) 146/82     Wt Readings from Last 5 Encounters:   04/29/25 83.8 kg (184 lb 11.9 oz)   02/26/25 80.7 kg (178 lb)   01/30/25 85.5 kg (188 lb 7.9 oz)   01/28/25 84.7 kg (186 lb 11.7 oz)   01/19/25 83.9 kg (185 lb)     Physical Exam  Vitals and nursing note reviewed.   Constitutional:       General: She is not in acute distress.  HENT:      Head: Normocephalic and atraumatic.   Cardiovascular:      Rate and Rhythm: Normal rate and regular rhythm.      Pulses: Normal " pulses.      Heart sounds: Normal heart sounds. No murmur heard.     No friction rub.   Pulmonary:      Effort: Pulmonary effort is normal. No respiratory distress.      Breath sounds: Normal breath sounds. No wheezing, rhonchi or rales.   Abdominal:      General: Abdomen is flat. There is no distension.   Musculoskeletal:         General: Normal range of motion.      Cervical back: Normal range of motion.      Right lower leg: No edema.      Left lower leg: No edema.   Skin:     General: Skin is warm and dry.      Capillary Refill: Capillary refill takes less than 2 seconds.   Neurological:      General: No focal deficit present.      Mental Status: She is alert and oriented to person, place, and time.      Gait: Gait normal.   Psychiatric:         Mood and Affect: Mood normal.         Thought Content: Thought content normal.           Lab Results   Component Value Date    WBC 6.80 04/29/2025    HGB 14.3 04/29/2025    HCT 43.6 04/29/2025     04/29/2025    CHOL 157 04/29/2025    TRIG 87 04/29/2025    HDL 47 04/29/2025    ALT 17 04/29/2025    AST 20 04/29/2025     04/29/2025    K 4.1 04/29/2025     04/29/2025    CREATININE 0.8 04/29/2025    BUN 10 04/29/2025    CO2 23 04/29/2025    TSH 2.53 06/08/2018    HGBA1C 5.4 10/10/2013      This note was generated with the assistance of ambient listening technology. Verbal consent was obtained by the patient and accompanying visitor(s) for the recording of patient appointment to facilitate this note. I attest to having reviewed and edited the generated note for accuracy, though some syntax or spelling errors may persist. Please contact the author of this note for any clarification.    JONATHAN Galdamez, DANNYC

## 2025-04-29 NOTE — PROGRESS NOTES
Verified pt by name and . Allergies verified. Per physician orders pt was administered Pneumococcal vaccine IM to left deltoid using aseptic technique. Pt tolerated well. No adverse effects or pain reported. MD notified.

## 2025-04-30 RX ORDER — VIT C/E/ZN/COPPR/LUTEIN/ZEAXAN 250MG-90MG
1000 CAPSULE ORAL DAILY
Qty: 30 CAPSULE | Refills: 3 | Status: SHIPPED | OUTPATIENT
Start: 2025-04-30

## 2025-04-30 NOTE — PROGRESS NOTES
Called and inform pt her labs are within normal limits except Vitamin D is deficient. Vitamin D supplement has been sent to your pharmacy to take once daily. If you have any questions or concerns please call the clinic. Pt verbalized understanding.

## 2025-05-08 ENCOUNTER — RESULTS FOLLOW-UP (OUTPATIENT)
Dept: PRIMARY CARE CLINIC | Facility: CLINIC | Age: 65
End: 2025-05-08
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
## (undated) DEVICE — BLADE SURG CARBON STEEL SZ11

## (undated) DEVICE — TRAY CATH UM FOLEY SIL W 16FR

## (undated) DEVICE — SEE MEDLINE ITEM 152622

## (undated) DEVICE — WARMER DRAPE STERILE LF

## (undated) DEVICE — SUT MCRYL PLUS 4-0 PS2 27IN

## (undated) DEVICE — STAPLER SKIN PROXIMATE WIDE

## (undated) DEVICE — CART STAPLE FLEX ETX 3.5MM BLU

## (undated) DEVICE — DRAPE STERI INSTRUMENT 1018

## (undated) DEVICE — DRAPE ABDOMINAL TIBURON 14X11

## (undated) DEVICE — KIT ANTIFOG

## (undated) DEVICE — SOL NS 1000CC

## (undated) DEVICE — SUT 0 VICRYL / UR6 (J603)

## (undated) DEVICE — SOL NACL .45% 1000ML

## (undated) DEVICE — ELECTRODE REM PLYHSV RETURN 9

## (undated) DEVICE — ADHESIVE MASTISOL VIAL 48/BX

## (undated) DEVICE — STAPLER INT LINEAR ARTC 3.5-45

## (undated) DEVICE — NDL HYPO REG 25G X 1 1/2

## (undated) DEVICE — DRESSING ABSRBNT ISLAND 3.6X8

## (undated) DEVICE — TUBING HF INSUFFLATION W/ FLTR

## (undated) DEVICE — TRAY MINOR GEN SURG